# Patient Record
Sex: MALE
[De-identification: names, ages, dates, MRNs, and addresses within clinical notes are randomized per-mention and may not be internally consistent; named-entity substitution may affect disease eponyms.]

---

## 2021-01-14 ENCOUNTER — HOSPITAL ENCOUNTER (INPATIENT)
Dept: HOSPITAL 53 - M ED | Age: 86
LOS: 111 days | Discharge: NURSING, REHAB OR LONG-TERM CARE | DRG: 42 | End: 2021-05-05
Attending: STUDENT IN AN ORGANIZED HEALTH CARE EDUCATION/TRAINING PROGRAM | Admitting: INTERNAL MEDICINE
Payer: COMMERCIAL

## 2021-01-14 VITALS — HEIGHT: 60 IN | WEIGHT: 126.1 LBS | BODY MASS INDEX: 24.76 KG/M2

## 2021-01-14 VITALS — SYSTOLIC BLOOD PRESSURE: 165 MMHG | DIASTOLIC BLOOD PRESSURE: 74 MMHG

## 2021-01-14 DIAGNOSIS — F01.51: ICD-10-CM

## 2021-01-14 DIAGNOSIS — N18.30: ICD-10-CM

## 2021-01-14 DIAGNOSIS — R42: ICD-10-CM

## 2021-01-14 DIAGNOSIS — E87.5: ICD-10-CM

## 2021-01-14 DIAGNOSIS — E03.9: ICD-10-CM

## 2021-01-14 DIAGNOSIS — R07.9: ICD-10-CM

## 2021-01-14 DIAGNOSIS — G31.83: Primary | ICD-10-CM

## 2021-01-14 DIAGNOSIS — N17.9: ICD-10-CM

## 2021-01-14 DIAGNOSIS — I12.9: ICD-10-CM

## 2021-01-14 DIAGNOSIS — D53.9: ICD-10-CM

## 2021-01-14 DIAGNOSIS — Z79.899: ICD-10-CM

## 2021-01-14 DIAGNOSIS — F02.81: ICD-10-CM

## 2021-01-14 LAB
ALBUMIN SERPL BCG-MCNC: 3.7 GM/DL (ref 3.2–5.2)
ALT SERPL W P-5'-P-CCNC: 40 U/L (ref 12–78)
BASOPHILS # BLD AUTO: 0 10^3/UL (ref 0–0.2)
BASOPHILS NFR BLD AUTO: 0.5 % (ref 0–1)
BILIRUB SERPL-MCNC: 0.3 MG/DL (ref 0.2–1)
BUN SERPL-MCNC: 38 MG/DL (ref 7–18)
BUN SERPL-MCNC: 41 MG/DL (ref 7–18)
CALCIUM SERPL-MCNC: 9.2 MG/DL (ref 8.8–10.2)
CALCIUM SERPL-MCNC: 9.4 MG/DL (ref 8.8–10.2)
CHLORIDE SERPL-SCNC: 108 MEQ/L (ref 98–107)
CHLORIDE SERPL-SCNC: 109 MEQ/L (ref 98–107)
CHOLEST SERPL-MCNC: 250 MG/DL (ref ?–200)
CHOLEST/HDLC SERPL: 3.12 {RATIO} (ref ?–5)
CO2 SERPL-SCNC: 23 MEQ/L (ref 21–32)
CO2 SERPL-SCNC: 26 MEQ/L (ref 21–32)
CREAT SERPL-MCNC: 1.42 MG/DL (ref 0.7–1.3)
CREAT SERPL-MCNC: 1.62 MG/DL (ref 0.7–1.3)
EOSINOPHIL # BLD AUTO: 0 10^3/UL (ref 0–0.5)
EOSINOPHIL NFR BLD AUTO: 0.7 % (ref 0–3)
EST. AVERAGE GLUCOSE BLD GHB EST-MCNC: 117 MG/DL (ref 60–110)
ETHANOL SERPL-MCNC: < 0.003 % (ref 0–0.01)
GFR SERPL CREATININE-BSD FRML MDRD: 43.5 ML/MIN/{1.73_M2} (ref 35–?)
GFR SERPL CREATININE-BSD FRML MDRD: 50.7 ML/MIN/{1.73_M2} (ref 35–?)
GLUCOSE SERPL-MCNC: 101 MG/DL (ref 70–100)
GLUCOSE SERPL-MCNC: 96 MG/DL (ref 70–100)
HCT VFR BLD AUTO: 34.6 % (ref 42–52)
HDLC SERPL-MCNC: 80 MG/DL (ref 40–?)
HGB BLD-MCNC: 11.1 G/DL (ref 13.5–17.5)
HIV 1+2 AB+HIV1 P24 AG SERPL QL IA: NEGATIVE
LDLC SERPL CALC-MCNC: 151 MG/DL (ref ?–100)
LYMPHOCYTES # BLD AUTO: 1.2 10^3/UL (ref 1.5–5)
LYMPHOCYTES NFR BLD AUTO: 22.2 % (ref 24–44)
MCH RBC QN AUTO: 31.2 PG (ref 27–33)
MCHC RBC AUTO-ENTMCNC: 32.1 G/DL (ref 32–36.5)
MCV RBC AUTO: 97.2 FL (ref 80–96)
MONOCYTES # BLD AUTO: 0.6 10^3/UL (ref 0–0.8)
MONOCYTES NFR BLD AUTO: 10.6 % (ref 0–5)
NEUTROPHILS # BLD AUTO: 3.7 10^3/UL (ref 1.5–8.5)
NEUTROPHILS NFR BLD AUTO: 65.6 % (ref 36–66)
NONHDLC SERPL-MCNC: 170 MG/DL
PLATELET # BLD AUTO: 189 10^3/UL (ref 150–450)
POTASSIUM SERPL-SCNC: 4.8 MEQ/L (ref 3.5–5.1)
POTASSIUM SERPL-SCNC: 5.2 MEQ/L (ref 3.5–5.1)
PROT SERPL-MCNC: 7.1 GM/DL (ref 6.4–8.2)
RBC # BLD AUTO: 3.56 10^6/UL (ref 4.3–6.1)
SODIUM SERPL-SCNC: 139 MEQ/L (ref 136–145)
SODIUM SERPL-SCNC: 140 MEQ/L (ref 136–145)
T3 SERPL-MCNC: 82.9 NG/DL (ref 60–181)
T4 FREE SERPL-MCNC: 1.37 NG/DL (ref 0.76–1.46)
TRIGL SERPL-MCNC: 95 MG/DL (ref ?–150)
TSH SERPL DL<=0.005 MIU/L-ACNC: 0.02 UIU/ML (ref 0.36–3.74)
VIT B12 SERPL-MCNC: 603 PG/ML (ref 247–911)
WBC # BLD AUTO: 5.6 10^3/UL (ref 4–10)

## 2021-01-14 RX ADMIN — SODIUM CHLORIDE SCH UNITS: 4.5 INJECTION, SOLUTION INTRAVENOUS at 20:31

## 2021-01-14 NOTE — REP
INDICATION:

AMS.



COMPARISON:

None.



TECHNIQUE:

SINGLE PORTABLE AP VIEW OF THE CHEST WAS PERFORMED.



FINDINGS:

THERE IS NO ACUTE INFILTRATE OR PULMONARY EDEMA.  LUNGS ARE CLEAR.  HEART IS NOT

SIGNIFICANTLY ENLARGED.  MEDIASTINAL SILHOUETTE IS UNREMARKABLE.  THE VISUALIZED

OSSEOUS STRUCTURES ARE INTACT.



IMPRESSION:

NO ACUTE PULMONARY DISEASE.





<Electronically signed by Ever Tracy > 01/14/21 1023

## 2021-01-14 NOTE — ECGEPIP
[facility] - ED

                                       

                                       Test Date:    2021

Pat Name:     FREDRICK ARCHER               Department:   

Patient ID:   [id number]                 Room:         -

Gender:       Male                     Technician:   CARLITO

:          1935               Requested By: Charlette Glass 

Order Number: [id number]     Reading MD:   Raoul Tafoya

                                 Measurements

Intervals                              Axis          

Rate:         60                       P:            49

MA:           157                      QRS:          1

QRSD:         90                       T:            10

QT:           412                                    

QTc:          413                                    

                           Interpretive Statements

SINUS RHYTHM

POOR R WAVE PROGRESSION

NONSPECIFIC T WAVE ABNORMALITY(S)

BASELINE ARTIFACT AFFECTS INTERPRETATION

NO PRIORS FOR COMPARISON

Electronically Signed on 2021 16:29:07 EST by Raoul Tafoya

## 2021-01-14 NOTE — CCD
Summarization Of Episode

                             Created on: 2021



FREDRICK ARCHER

External Reference #: [id number]

: 1935

Sex: Male



Demographics





                          Address                   [address] [place] [address]  [address]

 

                          Home Phone                [phone]

 

                          Preferred Language        English

 

                          Marital Status            

 

                          Samaritan Affiliation     NONE

 

                          Race                       or 

 

                          Ethnic Group               or 





Author





                          Author                    [facility]

 

                          Organization              [facility]

 

                          Address                   Unknown

 

                          Phone                     Unavailable







Support





                Name            Relationship    Address         Phone

 

                RE              Next Of Kin     Unknown         Unavailable

 

                    ARCHERSONIDO         Next Of Kin         [address]  [id number] [phone]



                                  



Re-disclosure Warning

          The records that you are about to access may contain information from 
federally-assisted alcohol or drug abuse programs. If such information is 
present, then the following federally mandated warning applies: This information
has been disclosed to you from records protected by federal confidentiality 
rules (42 CFR part 2). The federal rules prohibit you from making any further 
disclosure of this information unless further disclosure is expressly permitted 
by the written consent of the person to whom it pertains or as otherwise 
permitted by 42 CFR part 2. A general authorization for the release of medical 
or other information is NOT sufficient for this purpose. The Federal rules 
restrict any use of the information to criminally investigate or prosecute any 
alcohol or drug abuse patient.The records that you are about to access may 
contain highly sensitive health information, the redisclosure of which is 
protected by Article 27-F of the [facility] Public Health law. If you 
continue you may have access to information: Regarding HIV / AIDS; Provided by 
facilities licensed or operated by the [facility] Office of Mental Health; 
or Provided by the [facility] Office for People With Developmental 
Disabilities. If such information is present, then the following [place] 
mandated warning applies: This information has been disclosed to you from 
confidential records which are protected by state law. State law prohibits you 
from making any further disclosure of this information without the specific 
written consent of the person to whom it pertains, or as otherwise permitted by 
law. Any unauthorized further disclosure in violation of state law may result in
a fine or [facility] sentence or both. A general authorization for the release of 
medical or other information is NOT sufficient authorization for further disc
losure.                                                          



Insurance Providers

          



             Payer name   Policy type / Coverage type Policy ID    Covered party

 ID Covered 

party's relationship to munoz Policy Munoz             Plan Information

 

          [facility]           [id number]                             [id number]
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
## 2021-01-15 VITALS — SYSTOLIC BLOOD PRESSURE: 140 MMHG | DIASTOLIC BLOOD PRESSURE: 84 MMHG

## 2021-01-15 VITALS — SYSTOLIC BLOOD PRESSURE: 123 MMHG | DIASTOLIC BLOOD PRESSURE: 66 MMHG

## 2021-01-15 VITALS — DIASTOLIC BLOOD PRESSURE: 77 MMHG | SYSTOLIC BLOOD PRESSURE: 156 MMHG

## 2021-01-15 LAB
ALBUMIN SERPL BCG-MCNC: 3.4 GM/DL (ref 3.2–5.2)
ALT SERPL W P-5'-P-CCNC: 39 U/L (ref 12–78)
BILIRUB SERPL-MCNC: 0.4 MG/DL (ref 0.2–1)
BUN SERPL-MCNC: 38 MG/DL (ref 7–18)
CALCIUM SERPL-MCNC: 9.6 MG/DL (ref 8.8–10.2)
CHLORIDE SERPL-SCNC: 109 MEQ/L (ref 98–107)
CO2 SERPL-SCNC: 23 MEQ/L (ref 21–32)
CREAT SERPL-MCNC: 1.34 MG/DL (ref 0.7–1.3)
GFR SERPL CREATININE-BSD FRML MDRD: 54.2 ML/MIN/{1.73_M2} (ref 35–?)
GLUCOSE SERPL-MCNC: 68 MG/DL (ref 70–100)
HCT VFR BLD AUTO: 35.7 % (ref 42–52)
HGB BLD-MCNC: 11.7 G/DL (ref 13.5–17.5)
MCH RBC QN AUTO: 32.2 PG (ref 27–33)
MCHC RBC AUTO-ENTMCNC: 32.8 G/DL (ref 32–36.5)
MCV RBC AUTO: 98.3 FL (ref 80–96)
PLATELET # BLD AUTO: 189 10^3/UL (ref 150–450)
POTASSIUM SERPL-SCNC: 4.4 MEQ/L (ref 3.5–5.1)
PROT SERPL-MCNC: 7.1 GM/DL (ref 6.4–8.2)
RBC # BLD AUTO: 3.63 10^6/UL (ref 4.3–6.1)
SODIUM SERPL-SCNC: 139 MEQ/L (ref 136–145)
WBC # BLD AUTO: 5.3 10^3/UL (ref 4–10)

## 2021-01-15 RX ADMIN — SODIUM CHLORIDE SCH UNITS: 4.5 INJECTION, SOLUTION INTRAVENOUS at 21:39

## 2021-01-15 RX ADMIN — LEVOTHYROXINE SODIUM SCH MCG: 100 TABLET ORAL at 05:13

## 2021-01-15 RX ADMIN — SODIUM CHLORIDE SCH UNITS: 4.5 INJECTION, SOLUTION INTRAVENOUS at 08:54
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
## 2021-01-16 VITALS — DIASTOLIC BLOOD PRESSURE: 52 MMHG | SYSTOLIC BLOOD PRESSURE: 148 MMHG

## 2021-01-16 VITALS — DIASTOLIC BLOOD PRESSURE: 78 MMHG | SYSTOLIC BLOOD PRESSURE: 138 MMHG

## 2021-01-16 LAB
ALBUMIN SERPL BCG-MCNC: 3.3 GM/DL (ref 3.2–5.2)
ALT SERPL W P-5'-P-CCNC: 36 U/L (ref 12–78)
BILIRUB SERPL-MCNC: 0.4 MG/DL (ref 0.2–1)
BUN SERPL-MCNC: 40 MG/DL (ref 7–18)
CALCIUM SERPL-MCNC: 9.1 MG/DL (ref 8.8–10.2)
CHLORIDE SERPL-SCNC: 110 MEQ/L (ref 98–107)
CO2 SERPL-SCNC: 26 MEQ/L (ref 21–32)
CREAT SERPL-MCNC: 1.48 MG/DL (ref 0.7–1.3)
GFR SERPL CREATININE-BSD FRML MDRD: 48.3 ML/MIN/{1.73_M2} (ref 35–?)
GLUCOSE SERPL-MCNC: 97 MG/DL (ref 70–100)
HCT VFR BLD AUTO: 34.5 % (ref 42–52)
HGB BLD-MCNC: 11.3 G/DL (ref 13.5–17.5)
MCH RBC QN AUTO: 31.2 PG (ref 27–33)
MCHC RBC AUTO-ENTMCNC: 32.8 G/DL (ref 32–36.5)
MCV RBC AUTO: 95.3 FL (ref 80–96)
PLATELET # BLD AUTO: 195 10^3/UL (ref 150–450)
POTASSIUM SERPL-SCNC: 5.4 MEQ/L (ref 3.5–5.1)
PROT SERPL-MCNC: 7.2 GM/DL (ref 6.4–8.2)
RBC # BLD AUTO: 3.62 10^6/UL (ref 4.3–6.1)
SODIUM SERPL-SCNC: 141 MEQ/L (ref 136–145)
WBC # BLD AUTO: 4.6 10^3/UL (ref 4–10)

## 2021-01-16 RX ADMIN — SODIUM CHLORIDE SCH UNITS: 4.5 INJECTION, SOLUTION INTRAVENOUS at 09:10

## 2021-01-16 RX ADMIN — SODIUM CHLORIDE SCH UNITS: 4.5 INJECTION, SOLUTION INTRAVENOUS at 22:11

## 2021-01-16 RX ADMIN — LEVOTHYROXINE SODIUM SCH MCG: 100 TABLET ORAL at 09:10

## 2021-01-16 RX ADMIN — SODIUM CHLORIDE SCH MLS/HR: 9 INJECTION, SOLUTION INTRAVENOUS at 13:07

## 2021-01-16 RX ADMIN — SODIUM CHLORIDE SCH MLS/HR: 9 INJECTION, SOLUTION INTRAVENOUS at 22:58

## 2021-01-16 NOTE — REPVR
PROCEDURE INFORMATION: 

Exam: MR Head Without Contrast 

Exam date and time: 1/16/2021 6:00 AM 

Age: 85 years old 

Clinical indication: Altered mental status/memory loss; Age related cognitive 

decline; Additional info: Confusion, worsening 



TECHNIQUE: 

Imaging protocol: MR of the head without contrast. 



COMPARISON: 

No relevant prior studies available. 



FINDINGS: 

Brain: Moderate generalized cerebral volume loss. There are extensive scattered 

and confluent areas of white matter T2 hyperintensity, nonspecific but commonly 

secondary to chronic small vessel ischemic change. No acute ischemic 

infarction. No acute intracranial hemorrhage. 

Cerebral ventricles: No ventriculomegaly. 

Bones/joints: Unremarkable. 

Paranasal sinuses: Normal as visualized. 

Mastoid air cells: No mastoid effusion. 

Orbital cavity: Bilateral lens replacements. 

Soft tissues: Unremarkable. 



IMPRESSION: 

No acute intracranial abnormality. 



Electronically signed by: Sim Desouza On 01/16/2021  12:06:19 PM
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
## 2021-01-17 VITALS — SYSTOLIC BLOOD PRESSURE: 133 MMHG | DIASTOLIC BLOOD PRESSURE: 81 MMHG

## 2021-01-17 LAB
ALBUMIN SERPL BCG-MCNC: 3.1 GM/DL (ref 3.2–5.2)
ALT SERPL W P-5'-P-CCNC: 31 U/L (ref 12–78)
BILIRUB SERPL-MCNC: 0.3 MG/DL (ref 0.2–1)
BUN SERPL-MCNC: 38 MG/DL (ref 7–18)
CALCIUM SERPL-MCNC: 9 MG/DL (ref 8.8–10.2)
CHLORIDE SERPL-SCNC: 113 MEQ/L (ref 98–107)
CO2 SERPL-SCNC: 24 MEQ/L (ref 21–32)
CREAT SERPL-MCNC: 1.4 MG/DL (ref 0.7–1.3)
GFR SERPL CREATININE-BSD FRML MDRD: 51.5 ML/MIN/{1.73_M2} (ref 35–?)
GLUCOSE SERPL-MCNC: 90 MG/DL (ref 70–100)
HCT VFR BLD AUTO: 33.3 % (ref 42–52)
HGB BLD-MCNC: 10.8 G/DL (ref 13.5–17.5)
MCH RBC QN AUTO: 31.3 PG (ref 27–33)
MCHC RBC AUTO-ENTMCNC: 32.4 G/DL (ref 32–36.5)
MCV RBC AUTO: 96.5 FL (ref 80–96)
PLATELET # BLD AUTO: 189 10^3/UL (ref 150–450)
POTASSIUM SERPL-SCNC: 4.4 MEQ/L (ref 3.5–5.1)
PROT SERPL-MCNC: 6.9 GM/DL (ref 6.4–8.2)
RBC # BLD AUTO: 3.45 10^6/UL (ref 4.3–6.1)
SODIUM SERPL-SCNC: 143 MEQ/L (ref 136–145)
WBC # BLD AUTO: 4.8 10^3/UL (ref 4–10)

## 2021-01-17 RX ADMIN — SODIUM CHLORIDE SCH MLS/HR: 9 INJECTION, SOLUTION INTRAVENOUS at 10:03

## 2021-01-17 RX ADMIN — LEVOTHYROXINE SODIUM SCH MCG: 100 TABLET ORAL at 06:55

## 2021-01-17 RX ADMIN — SODIUM CHLORIDE SCH UNITS: 4.5 INJECTION, SOLUTION INTRAVENOUS at 20:57

## 2021-01-17 RX ADMIN — SODIUM CHLORIDE SCH UNITS: 4.5 INJECTION, SOLUTION INTRAVENOUS at 10:02

## 2021-01-17 RX ADMIN — SODIUM CHLORIDE SCH MLS/HR: 9 INJECTION, SOLUTION INTRAVENOUS at 20:57
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
## 2021-01-18 VITALS — SYSTOLIC BLOOD PRESSURE: 160 MMHG | DIASTOLIC BLOOD PRESSURE: 66 MMHG

## 2021-01-18 LAB
ALBUMIN SERPL BCG-MCNC: 3.1 GM/DL (ref 3.2–5.2)
ALT SERPL W P-5'-P-CCNC: 31 U/L (ref 12–78)
BILIRUB SERPL-MCNC: 0.4 MG/DL (ref 0.2–1)
BUN SERPL-MCNC: 25 MG/DL (ref 7–18)
CALCIUM SERPL-MCNC: 8.8 MG/DL (ref 8.8–10.2)
CHLORIDE SERPL-SCNC: 112 MEQ/L (ref 98–107)
CO2 SERPL-SCNC: 26 MEQ/L (ref 21–32)
CREAT SERPL-MCNC: 1.19 MG/DL (ref 0.7–1.3)
GFR SERPL CREATININE-BSD FRML MDRD: > 60 ML/MIN/{1.73_M2} (ref 35–?)
GLUCOSE SERPL-MCNC: 82 MG/DL (ref 70–100)
HCT VFR BLD AUTO: 33.2 % (ref 42–52)
HGB BLD-MCNC: 10.7 G/DL (ref 13.5–17.5)
MCH RBC QN AUTO: 31.9 PG (ref 27–33)
MCHC RBC AUTO-ENTMCNC: 32.2 G/DL (ref 32–36.5)
MCV RBC AUTO: 99.1 FL (ref 80–96)
PLATELET # BLD AUTO: 177 10^3/UL (ref 150–450)
POTASSIUM SERPL-SCNC: 4.7 MEQ/L (ref 3.5–5.1)
PROT SERPL-MCNC: 6.2 GM/DL (ref 6.4–8.2)
RBC # BLD AUTO: 3.35 10^6/UL (ref 4.3–6.1)
SODIUM SERPL-SCNC: 143 MEQ/L (ref 136–145)
WBC # BLD AUTO: 4.5 10^3/UL (ref 4–10)

## 2021-01-18 RX ADMIN — LEVOTHYROXINE SODIUM SCH MCG: 100 TABLET ORAL at 06:10

## 2021-01-18 RX ADMIN — SODIUM CHLORIDE SCH UNITS: 4.5 INJECTION, SOLUTION INTRAVENOUS at 08:49

## 2021-01-18 RX ADMIN — SODIUM CHLORIDE SCH UNITS: 4.5 INJECTION, SOLUTION INTRAVENOUS at 22:00

## 2021-01-18 RX ADMIN — SODIUM CHLORIDE SCH MLS/HR: 9 INJECTION, SOLUTION INTRAVENOUS at 06:43
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
## 2021-01-19 VITALS — SYSTOLIC BLOOD PRESSURE: 118 MMHG | DIASTOLIC BLOOD PRESSURE: 69 MMHG

## 2021-01-19 RX ADMIN — SODIUM CHLORIDE SCH UNITS: 4.5 INJECTION, SOLUTION INTRAVENOUS at 09:00

## 2021-01-19 RX ADMIN — SODIUM CHLORIDE SCH UNITS: 4.5 INJECTION, SOLUTION INTRAVENOUS at 20:44

## 2021-01-19 RX ADMIN — SODIUM CHLORIDE SCH UNITS: 4.5 INJECTION, SOLUTION INTRAVENOUS at 21:00

## 2021-01-19 RX ADMIN — LEVOTHYROXINE SODIUM SCH MCG: 100 TABLET ORAL at 06:35

## 2021-01-19 NOTE — IPNPDOC
Text Note


Date of Service


The patient was seen on 1/19/21.





NOTE


TIME OF SERVICE 1115PM


Code 25 was called bc  was agitated and trying to leave his room.


At the time of my assessment with the assistance of a  the topic of 

his conversation was not congruent with the questions I was asking him. He was 

not able to name the building that we were in or the season.Per chart review he 

had received haloperidol and quetiapine a few hours ago; he has not recently had

leukocytosis and his most recent vitals were wnl.. RN Supervisor Taylor Munoz 

called his grand-daughter to see if she could come and help keep the patient 

oriented but she was unable to do so.





#Delirium vs Sundowning


Plan: olanzapine 5mg IM x 1/ I asked his RN to check his serum glucose and 

vitals once he ha calmed down / will also order a CBC, CMP and UA





VS,Fishbone, I+O


VS, Fishbone, I+O





Vital Signs








  Date Time  Temp Pulse Resp B/P (MAP) Pulse Ox O2 Delivery O2 Flow Rate FiO2


 


1/18/21 06:10 97.4 72 18 160/66 (97) 99 Room Air  














I&O- Last 24 Hours up to 6 AM 


 


 1/19/21





 06:00


 


Intake Total 2910 ml


 


Output Total 600 ml


 


Balance 2310 ml

















TOÑO YATES MD                Jan 19, 2021 23:47

## 2021-01-20 VITALS — SYSTOLIC BLOOD PRESSURE: 172 MMHG | DIASTOLIC BLOOD PRESSURE: 91 MMHG

## 2021-01-20 LAB
ALBUMIN SERPL BCG-MCNC: 3.4 GM/DL (ref 3.2–5.2)
ALT SERPL W P-5'-P-CCNC: 64 U/L (ref 12–78)
BASOPHILS # BLD AUTO: 0 10^3/UL (ref 0–0.2)
BASOPHILS NFR BLD AUTO: 0.6 % (ref 0–1)
BILIRUB SERPL-MCNC: 0.3 MG/DL (ref 0.2–1)
BUN SERPL-MCNC: 25 MG/DL (ref 7–18)
CALCIUM SERPL-MCNC: 9.5 MG/DL (ref 8.8–10.2)
CHLORIDE SERPL-SCNC: 107 MEQ/L (ref 98–107)
CO2 SERPL-SCNC: 26 MEQ/L (ref 21–32)
CREAT SERPL-MCNC: 1.41 MG/DL (ref 0.7–1.3)
CRP SERPL-MCNC: 0.33 MG/DL (ref 0–0.3)
EOSINOPHIL # BLD AUTO: 0.1 10^3/UL (ref 0–0.5)
EOSINOPHIL NFR BLD AUTO: 1.5 % (ref 0–3)
GFR SERPL CREATININE-BSD FRML MDRD: 51.1 ML/MIN/{1.73_M2} (ref 35–?)
GLUCOSE SERPL-MCNC: 117 MG/DL (ref 70–100)
HCT VFR BLD AUTO: 36.1 % (ref 42–52)
HGB BLD-MCNC: 11.9 G/DL (ref 13.5–17.5)
LYMPHOCYTES # BLD AUTO: 1.1 10^3/UL (ref 1.5–5)
LYMPHOCYTES NFR BLD AUTO: 30.5 % (ref 24–44)
MCH RBC QN AUTO: 32 PG (ref 27–33)
MCHC RBC AUTO-ENTMCNC: 33 G/DL (ref 32–36.5)
MCV RBC AUTO: 97 FL (ref 80–96)
MONOCYTES # BLD AUTO: 0.5 10^3/UL (ref 0–0.8)
MONOCYTES NFR BLD AUTO: 14.2 % (ref 0–5)
NEUTROPHILS # BLD AUTO: 1.8 10^3/UL (ref 1.5–8.5)
NEUTROPHILS NFR BLD AUTO: 52.9 % (ref 36–66)
PLATELET # BLD AUTO: 187 10^3/UL (ref 150–450)
POTASSIUM SERPL-SCNC: 4.7 MEQ/L (ref 3.5–5.1)
PROT SERPL-MCNC: 7.5 GM/DL (ref 6.4–8.2)
RBC # BLD AUTO: 3.72 10^6/UL (ref 4.3–6.1)
RSV RNA NPH QL NAA+PROBE: NEGATIVE
SODIUM SERPL-SCNC: 135 MEQ/L (ref 136–145)
WBC # BLD AUTO: 3.4 10^3/UL (ref 4–10)

## 2021-01-20 RX ADMIN — LEVOTHYROXINE SODIUM SCH MCG: 100 TABLET ORAL at 06:16

## 2021-01-20 RX ADMIN — SODIUM CHLORIDE SCH UNITS: 4.5 INJECTION, SOLUTION INTRAVENOUS at 07:25

## 2021-01-20 RX ADMIN — SODIUM CHLORIDE SCH UNITS: 4.5 INJECTION, SOLUTION INTRAVENOUS at 20:27

## 2021-01-21 VITALS — SYSTOLIC BLOOD PRESSURE: 153 MMHG | DIASTOLIC BLOOD PRESSURE: 74 MMHG

## 2021-01-21 RX ADMIN — LEVOTHYROXINE SODIUM SCH MCG: 100 TABLET ORAL at 05:06

## 2021-01-21 RX ADMIN — SODIUM CHLORIDE SCH UNITS: 4.5 INJECTION, SOLUTION INTRAVENOUS at 09:00

## 2021-01-21 RX ADMIN — SODIUM CHLORIDE SCH UNITS: 4.5 INJECTION, SOLUTION INTRAVENOUS at 21:15

## 2021-01-21 NOTE — IPNPDOC
Text Note


Date of Service


The patient was seen on 1/21/21.





NOTE


SUBJECTIVE:


Patient seen at bedside. Chart reviewed. Discussed with psychiatry.





OBJECTIVE: 


General: NAD


Patient not co-operative for remainder of exam.





A/P:


85-year-old male with PMHx of hypothyroidism, hypertension, questionable 

dementia admitted for altered mental status likely 2/2 to dementia (Lewey body 

versus Parkinson's?) with behaviors.


 


#Dementia (Lewy Body dementia/Parkinson's dementia?) likely with behaviors 

(aggression/violence)


-Also per psych, likely neurocognitive disorder, likely due to ischemia 


-MRI - no acute pathology


-Syphilis: nonreactive


-HIV: neg 


-TSH low but T3 and T4 normal


-UA: neg 


-case discussed previously with Dr. Gonzalez, neurology 


-Pysch assess, see their note in chart. 


-discussed again today with psych - increased qhs dosing of haldol, continue PRN

dosing, continue seroquel





#Acute kidney injury


- resolved


-grossly at baseline


- avoid nephrotoxic medications





#Anemia, macrocytic


-Vit B12, folate wnl


-No s/s of bleeding  





#Hypothyroidism


-TSH low, T3 and T4 wnl


- previously home synthroid decreased by 25 mcg


-C/w levothyroxine 100 mcg PO daily for now. 





#HTN. 


-Stable


-not on home meds





#DVT px 


-Heparin sc 





Resolved issues: 


Hyperkalemia likely 2/2 to acute kidney injury





DISPOSITION: Pending placement





VS,Fishbone, I+O


VS, Fishbone, I+O





Vital Signs








  Date Time  Temp Pulse Resp B/P (MAP) Pulse Ox O2 Delivery O2 Flow Rate FiO2


 


1/21/21 04:00 96.8 77 18 153/74 (100) 97 Room Air  














I&O- Last 24 Hours up to 6 AM 


 


 1/21/21





 06:00


 


Intake Total 590 ml


 


Output Total 0 ml


 


Balance 590 ml

















RAMON WISE MD              Jan 21, 2021 09:38

## 2021-01-22 VITALS — DIASTOLIC BLOOD PRESSURE: 71 MMHG | SYSTOLIC BLOOD PRESSURE: 150 MMHG

## 2021-01-22 RX ADMIN — SODIUM CHLORIDE SCH UNITS: 4.5 INJECTION, SOLUTION INTRAVENOUS at 20:33

## 2021-01-22 RX ADMIN — LEVOTHYROXINE SODIUM SCH MCG: 100 TABLET ORAL at 05:00

## 2021-01-22 RX ADMIN — SODIUM CHLORIDE SCH UNITS: 4.5 INJECTION, SOLUTION INTRAVENOUS at 10:06

## 2021-01-23 RX ADMIN — SODIUM CHLORIDE SCH UNITS: 4.5 INJECTION, SOLUTION INTRAVENOUS at 21:36

## 2021-01-23 RX ADMIN — SODIUM CHLORIDE SCH UNITS: 4.5 INJECTION, SOLUTION INTRAVENOUS at 09:00

## 2021-01-23 RX ADMIN — LEVOTHYROXINE SODIUM SCH MCG: 100 TABLET ORAL at 06:00

## 2021-01-24 VITALS — SYSTOLIC BLOOD PRESSURE: 151 MMHG | DIASTOLIC BLOOD PRESSURE: 70 MMHG

## 2021-01-24 RX ADMIN — LEVOTHYROXINE SODIUM SCH MCG: 100 TABLET ORAL at 06:07

## 2021-01-24 RX ADMIN — SODIUM CHLORIDE SCH UNITS: 4.5 INJECTION, SOLUTION INTRAVENOUS at 21:36

## 2021-01-24 RX ADMIN — SODIUM CHLORIDE SCH UNITS: 4.5 INJECTION, SOLUTION INTRAVENOUS at 08:42

## 2021-01-25 VITALS — DIASTOLIC BLOOD PRESSURE: 72 MMHG | SYSTOLIC BLOOD PRESSURE: 144 MMHG

## 2021-01-25 RX ADMIN — LEVOTHYROXINE SODIUM SCH MCG: 100 TABLET ORAL at 06:00

## 2021-01-25 RX ADMIN — SODIUM CHLORIDE SCH UNITS: 4.5 INJECTION, SOLUTION INTRAVENOUS at 21:00

## 2021-01-25 RX ADMIN — SODIUM CHLORIDE SCH UNITS: 4.5 INJECTION, SOLUTION INTRAVENOUS at 09:12

## 2021-01-26 VITALS — SYSTOLIC BLOOD PRESSURE: 152 MMHG | DIASTOLIC BLOOD PRESSURE: 71 MMHG

## 2021-01-26 LAB
ALBUMIN SERPL BCG-MCNC: 3.5 GM/DL (ref 3.2–5.2)
ALT SERPL W P-5'-P-CCNC: 56 U/L (ref 12–78)
BILIRUB SERPL-MCNC: 0.2 MG/DL (ref 0.2–1)
BUN SERPL-MCNC: 29 MG/DL (ref 7–18)
CALCIUM SERPL-MCNC: 9.6 MG/DL (ref 8.8–10.2)
CHLORIDE SERPL-SCNC: 107 MEQ/L (ref 98–107)
CO2 SERPL-SCNC: 28 MEQ/L (ref 21–32)
CREAT SERPL-MCNC: 1.38 MG/DL (ref 0.7–1.3)
GFR SERPL CREATININE-BSD FRML MDRD: 52.4 ML/MIN/{1.73_M2} (ref 35–?)
GLUCOSE SERPL-MCNC: 113 MG/DL (ref 70–100)
HCT VFR BLD AUTO: 37.6 % (ref 42–52)
HGB BLD-MCNC: 12.1 G/DL (ref 13.5–17.5)
MCH RBC QN AUTO: 31 PG (ref 27–33)
MCHC RBC AUTO-ENTMCNC: 32.2 G/DL (ref 32–36.5)
MCV RBC AUTO: 96.4 FL (ref 80–96)
PLATELET # BLD AUTO: 227 10^3/UL (ref 150–450)
POTASSIUM SERPL-SCNC: 4.9 MEQ/L (ref 3.5–5.1)
PROT SERPL-MCNC: 7 GM/DL (ref 6.4–8.2)
RBC # BLD AUTO: 3.9 10^6/UL (ref 4.3–6.1)
SODIUM SERPL-SCNC: 141 MEQ/L (ref 136–145)
WBC # BLD AUTO: 3.5 10^3/UL (ref 4–10)

## 2021-01-26 RX ADMIN — LEVOTHYROXINE SODIUM SCH MCG: 100 TABLET ORAL at 08:01

## 2021-01-26 RX ADMIN — SODIUM CHLORIDE SCH UNITS: 4.5 INJECTION, SOLUTION INTRAVENOUS at 21:10

## 2021-01-26 RX ADMIN — SODIUM CHLORIDE SCH UNITS: 4.5 INJECTION, SOLUTION INTRAVENOUS at 08:02

## 2021-01-26 NOTE — IPNPDOC
Date Seen


The patient was seen on 1/26/21.





Progress Note


SUBJECTIVE:


 service used for evaluation. No acute events overnight. Denies chest 

pain, n/v/d. 





OBJECTIVE: 





PHYSICAL EXAMINATION: 


VS: Please see below 


CONSTITUTIONAL: No acute distress, resting comfortably, Awake and alert oriented

to self, place but not time 


EYES: PERRLA, EOM intact


HENT, MOUTH: Normocephalic, atraumatic, moist mucous membranes


NECK: SUPPLE, no JVD, no lymphadenopathy, no carotid bruit


CV: Regular rate and rhythm, S1S2 normal, no murmurs/rubs/gallops


RESPIRATORY: Clear to auscultation bilaterally, no rales/rhonchi/wheezes


GI:  BS positive in 4 quadrants, soft, nontender, nondistended, no rebound or 

guarding, no organomegaly


: Deferred


MUSCULOSKELETAL: Normal ROM. No cyanosis, clubbing, swelling, joint deformity, 

extremity edema


INTEGUMENTARY:  Intact, no rashes, no lesions, no erythema


NEUROLOGIC: Cranial Nerves II-XII are intact, no focal deficits


PSYCHIATRIC: cooperative, mood and affect appropriate .





LABORATORY: 


Please see below 





A/P:


85-year-old male with PMHx of hypothyroidism, hypertension, questionable 

dementia admitted for altered mental status likely 2/2 to dementia (Lewey body 

versus Parkinson's?) with behaviors.


 


#Dementia (Lewy Body dementia/Parkinson's dementia?) likely with behaviors 

(aggression/violence)


-Also per psych, likely neurocognitive disorder, likely due to ischemia 


-MRI - no acute pathology


-Syphilis: nonreactive


-HIV: neg 


-TSH low but T3 and T4 normal


-UA: neg 


-case discussed previously with Dr. Gonzalez, neurology 


-[facility] assess, see their note in chart. 


-C/w  qhs dosing of haldol, haldol PRN dosing, seroquel BID 


-Requiring placement, PFS/SW working on this 





#CKD Stage 2-3? 


-Unknown baseline but now believed patient likely has had increased Cr for some 

time 


-Cr 1.38


-Follow BMP regularly 


- avoid nephrotoxic medications





#Anemia, macrocytic


-Vit B12, folate wnl


-No s/s of bleeding  





#Hypothyroidism


-TSH low, T3 and T4 wnl


- previously home synthroid decreased by 25 mcg


-C/w levothyroxine 100 mcg PO daily for now. 





#HTN. 


-Stable


-not on home meds





#DVT px 


-Heparin sc 





DISPOSITION: Pending placement. PFS/SW working on avenues necessary to proceed 

forward.





VS, I&O, 24H, Fishbone


Vital Signs/I&O





Vital Signs








  Date Time  Temp Pulse Resp B/P (MAP) Pulse Ox O2 Delivery O2 Flow Rate FiO2


 


1/26/21 06:00 97.1 69 16 152/71 (98) 98 Room Air  














I&O- Last 24 Hours up to 6 AM 


 


 1/26/21





 06:00


 


Intake Total 1320 ml


 


Balance 1320 ml











Laboratory Data


24H LABS


Laboratory Tests 2


1/26/21 09:17: 


Nucleated Red Blood Cells % (auto) 0.0, Anion Gap 6L, Glomerular Filtration Rate

52.4, Calcium Level 9.6, Total Bilirubin 0.2, Aspartate Amino Transf (AST/SGOT) 

29, Alanine Aminotransferase (ALT/SGPT) 56, Alkaline Phosphatase 73, Total 

Protein 7.0, Albumin 3.5, Albumin/Globulin Ratio 1.0


CBC/BMP


Laboratory Tests


1/26/21 09:17











Current Medications





Current Medications








 Medications


  (Trade)  Dose


 Ordered  Sig/Radha


 Route


 PRN Reason  Start Time


 Stop Time Status Last Admin


Dose Admin


 


 Haloperidol


  (Haldol)  0.5 mg  TIDP  PRN


 PO


 ANXIETY/AGITATION  1/15/21 19:30


    1/22/21 23:41





 


 Haloperidol


  (Haldol)  1 mg  QHS


 PO


   1/15/21 21:00


 1/21/21 09:32 DC 1/20/21 20:27





 


 Haloperidol


  (Haldol)  1.5 mg  QHS


 PO


   1/21/21 21:00


    1/25/21 21:22





 


 Heparin Sodium


  (Porcine)


  (Heparin)  5,000 units  Q12H


 SQ


   1/14/21 21:00


    1/26/21 08:02





 


 Home Med


  (Med Rec


 Complete!)    ASDIRECTED


 XX


   1/14/21 12:15


 1/14/21 12:07 DC  





 


 Levothyroxine


 Sodium


  (Synthroid)  100 mcg  DAILY@06


 PO


   1/15/21 06:00


    1/26/21 08:01





 


 Quetiapine


 Fumarate


  (SEROquel)  25 mg  BID


 PO


   1/14/21 21:00


    1/26/21 08:01





 


 Sodium Chloride  1,000 ml @ 


 100 mls/hr  Q10H


 IV


   1/14/21 12:15


 1/14/21 20:53 DC 1/14/21 14:47





 


 Sodium Chloride  1,000 ml @ 


 100 mls/hr  Q10H


 IV


   1/16/21 12:15


 1/18/21 07:45 DC 1/18/21 06:43





 


 Trazodone HCl


  (Desyrel)  50 mg  QHS


 PO


   1/14/21 21:00


 1/14/21 14:33 DC  














Allergies


Coded Allergies:  


     No Known Drug Allergies (Verified  Allergy, Unknown, 1/14/21)











Bianka Matt MD            Jan 26, 2021 13:23

## 2021-01-27 VITALS — DIASTOLIC BLOOD PRESSURE: 75 MMHG | SYSTOLIC BLOOD PRESSURE: 167 MMHG

## 2021-01-27 RX ADMIN — LEVOTHYROXINE SODIUM SCH MCG: 100 TABLET ORAL at 06:47

## 2021-01-27 RX ADMIN — SODIUM CHLORIDE SCH UNITS: 4.5 INJECTION, SOLUTION INTRAVENOUS at 09:19

## 2021-01-27 RX ADMIN — SODIUM CHLORIDE SCH UNITS: 4.5 INJECTION, SOLUTION INTRAVENOUS at 19:46

## 2021-01-28 RX ADMIN — SODIUM CHLORIDE SCH UNITS: 4.5 INJECTION, SOLUTION INTRAVENOUS at 19:32

## 2021-01-28 RX ADMIN — LEVOTHYROXINE SODIUM SCH MCG: 100 TABLET ORAL at 06:00

## 2021-01-28 RX ADMIN — SODIUM CHLORIDE SCH UNITS: 4.5 INJECTION, SOLUTION INTRAVENOUS at 08:14

## 2021-01-29 VITALS — DIASTOLIC BLOOD PRESSURE: 90 MMHG | SYSTOLIC BLOOD PRESSURE: 165 MMHG

## 2021-01-29 RX ADMIN — LEVOTHYROXINE SODIUM SCH MCG: 100 TABLET ORAL at 05:56

## 2021-01-29 RX ADMIN — SODIUM CHLORIDE SCH UNITS: 4.5 INJECTION, SOLUTION INTRAVENOUS at 19:29

## 2021-01-29 RX ADMIN — SODIUM CHLORIDE SCH UNITS: 4.5 INJECTION, SOLUTION INTRAVENOUS at 08:32

## 2021-01-30 VITALS — DIASTOLIC BLOOD PRESSURE: 65 MMHG | SYSTOLIC BLOOD PRESSURE: 140 MMHG

## 2021-01-30 RX ADMIN — SODIUM CHLORIDE SCH UNITS: 4.5 INJECTION, SOLUTION INTRAVENOUS at 19:28

## 2021-01-30 RX ADMIN — LEVOTHYROXINE SODIUM SCH MCG: 100 TABLET ORAL at 05:18

## 2021-01-30 RX ADMIN — SODIUM CHLORIDE SCH UNITS: 4.5 INJECTION, SOLUTION INTRAVENOUS at 09:05

## 2021-01-31 VITALS — SYSTOLIC BLOOD PRESSURE: 141 MMHG | DIASTOLIC BLOOD PRESSURE: 69 MMHG

## 2021-01-31 RX ADMIN — DOCUSATE SODIUM SCH MG: 100 CAPSULE, LIQUID FILLED ORAL at 09:00

## 2021-01-31 RX ADMIN — SODIUM CHLORIDE SCH UNITS: 4.5 INJECTION, SOLUTION INTRAVENOUS at 19:45

## 2021-01-31 RX ADMIN — DOCUSATE SODIUM SCH MG: 100 CAPSULE, LIQUID FILLED ORAL at 19:44

## 2021-01-31 RX ADMIN — SODIUM CHLORIDE SCH UNITS: 4.5 INJECTION, SOLUTION INTRAVENOUS at 08:44

## 2021-01-31 RX ADMIN — LEVOTHYROXINE SODIUM SCH MCG: 100 TABLET ORAL at 06:15

## 2021-02-01 RX ADMIN — DOCUSATE SODIUM SCH MG: 100 CAPSULE, LIQUID FILLED ORAL at 09:47

## 2021-02-01 RX ADMIN — LEVOTHYROXINE SODIUM SCH MCG: 100 TABLET ORAL at 06:27

## 2021-02-01 RX ADMIN — SODIUM CHLORIDE SCH UNITS: 4.5 INJECTION, SOLUTION INTRAVENOUS at 20:01

## 2021-02-01 RX ADMIN — DOCUSATE SODIUM SCH MG: 100 CAPSULE, LIQUID FILLED ORAL at 20:01

## 2021-02-01 RX ADMIN — SODIUM CHLORIDE SCH UNITS: 4.5 INJECTION, SOLUTION INTRAVENOUS at 09:47

## 2021-02-02 VITALS — SYSTOLIC BLOOD PRESSURE: 148 MMHG | DIASTOLIC BLOOD PRESSURE: 71 MMHG

## 2021-02-02 RX ADMIN — DOCUSATE SODIUM SCH MG: 100 CAPSULE, LIQUID FILLED ORAL at 09:35

## 2021-02-02 RX ADMIN — DOCUSATE SODIUM SCH MG: 100 CAPSULE, LIQUID FILLED ORAL at 20:47

## 2021-02-02 RX ADMIN — SODIUM CHLORIDE SCH UNITS: 4.5 INJECTION, SOLUTION INTRAVENOUS at 09:35

## 2021-02-02 RX ADMIN — SODIUM CHLORIDE SCH UNITS: 4.5 INJECTION, SOLUTION INTRAVENOUS at 20:47

## 2021-02-02 RX ADMIN — LEVOTHYROXINE SODIUM SCH MCG: 100 TABLET ORAL at 06:04

## 2021-02-03 VITALS — DIASTOLIC BLOOD PRESSURE: 69 MMHG | SYSTOLIC BLOOD PRESSURE: 145 MMHG

## 2021-02-03 RX ADMIN — LEVOTHYROXINE SODIUM SCH MCG: 100 TABLET ORAL at 06:06

## 2021-02-03 RX ADMIN — DOCUSATE SODIUM SCH MG: 100 CAPSULE, LIQUID FILLED ORAL at 20:33

## 2021-02-03 RX ADMIN — SODIUM CHLORIDE SCH UNITS: 4.5 INJECTION, SOLUTION INTRAVENOUS at 20:33

## 2021-02-03 RX ADMIN — DOCUSATE SODIUM SCH MG: 100 CAPSULE, LIQUID FILLED ORAL at 08:34

## 2021-02-03 RX ADMIN — SODIUM CHLORIDE SCH UNITS: 4.5 INJECTION, SOLUTION INTRAVENOUS at 08:34

## 2021-02-04 VITALS — SYSTOLIC BLOOD PRESSURE: 113 MMHG | DIASTOLIC BLOOD PRESSURE: 60 MMHG

## 2021-02-04 RX ADMIN — SODIUM CHLORIDE SCH UNITS: 4.5 INJECTION, SOLUTION INTRAVENOUS at 08:24

## 2021-02-04 RX ADMIN — SODIUM CHLORIDE SCH UNITS: 4.5 INJECTION, SOLUTION INTRAVENOUS at 21:04

## 2021-02-04 RX ADMIN — DOCUSATE SODIUM SCH MG: 100 CAPSULE, LIQUID FILLED ORAL at 21:03

## 2021-02-04 RX ADMIN — LEVOTHYROXINE SODIUM SCH MCG: 100 TABLET ORAL at 06:08

## 2021-02-04 RX ADMIN — DOCUSATE SODIUM SCH MG: 100 CAPSULE, LIQUID FILLED ORAL at 08:24

## 2021-02-05 VITALS — SYSTOLIC BLOOD PRESSURE: 128 MMHG | DIASTOLIC BLOOD PRESSURE: 74 MMHG

## 2021-02-05 RX ADMIN — LEVOTHYROXINE SODIUM SCH MCG: 100 TABLET ORAL at 06:09

## 2021-02-05 RX ADMIN — SODIUM CHLORIDE SCH UNITS: 4.5 INJECTION, SOLUTION INTRAVENOUS at 09:06

## 2021-02-05 RX ADMIN — DOCUSATE SODIUM SCH MG: 100 CAPSULE, LIQUID FILLED ORAL at 20:11

## 2021-02-05 RX ADMIN — SODIUM CHLORIDE SCH UNITS: 4.5 INJECTION, SOLUTION INTRAVENOUS at 20:11

## 2021-02-05 RX ADMIN — DOCUSATE SODIUM SCH MG: 100 CAPSULE, LIQUID FILLED ORAL at 09:06

## 2021-02-06 VITALS — SYSTOLIC BLOOD PRESSURE: 129 MMHG | DIASTOLIC BLOOD PRESSURE: 74 MMHG

## 2021-02-06 RX ADMIN — DOCUSATE SODIUM SCH MG: 100 CAPSULE, LIQUID FILLED ORAL at 09:35

## 2021-02-06 RX ADMIN — DOCUSATE SODIUM SCH MG: 100 CAPSULE, LIQUID FILLED ORAL at 21:27

## 2021-02-06 RX ADMIN — SODIUM CHLORIDE SCH UNITS: 4.5 INJECTION, SOLUTION INTRAVENOUS at 21:28

## 2021-02-06 RX ADMIN — SODIUM CHLORIDE SCH UNITS: 4.5 INJECTION, SOLUTION INTRAVENOUS at 09:35

## 2021-02-06 RX ADMIN — LEVOTHYROXINE SODIUM SCH MCG: 100 TABLET ORAL at 06:14

## 2021-02-07 VITALS — DIASTOLIC BLOOD PRESSURE: 73 MMHG | SYSTOLIC BLOOD PRESSURE: 130 MMHG

## 2021-02-07 RX ADMIN — DOCUSATE SODIUM SCH MG: 100 CAPSULE, LIQUID FILLED ORAL at 09:19

## 2021-02-07 RX ADMIN — SODIUM CHLORIDE SCH UNITS: 4.5 INJECTION, SOLUTION INTRAVENOUS at 09:19

## 2021-02-07 RX ADMIN — SODIUM CHLORIDE SCH UNITS: 4.5 INJECTION, SOLUTION INTRAVENOUS at 21:08

## 2021-02-07 RX ADMIN — LEVOTHYROXINE SODIUM SCH MCG: 100 TABLET ORAL at 05:17

## 2021-02-07 RX ADMIN — DOCUSATE SODIUM SCH MG: 100 CAPSULE, LIQUID FILLED ORAL at 21:08

## 2021-02-07 NOTE — IPNPDOC
Text Note


Date of Service


The patient was seen on 2/7/21.





NOTE


SUBJECTIVE:


No acute events overnight. Denies any medical complaints.  





OBJECTIVE: 


VS: Please see below 


CONSTITUTIONAL: No acute distress, resting comfortably, Awake and alert oriented

to self, place but not time 


EYES: PERRLA, EOM intact


HENT, MOUTH: Normocephalic, atraumatic, moist mucous membranes


NECK: SUPPLE, no JVD, no lymphadenopathy, no carotid bruit


CV: Regular rate and rhythm, S1S2 normal, no murmurs/rubs/gallops


RESPIRATORY: Clear to auscultation bilaterally, no rales/rhonchi/wheezes


GI:  BS positive in 4 quadrants, soft, nontender, nondistended, no rebound or 

guarding, no organomegaly


: Deferred


MUSCULOSKELETAL: Normal ROM. No cyanosis, clubbing, swelling, joint deformity, 

extremity edema


INTEGUMENTARY:  Intact, no rashes, no lesions, no erythema


NEUROLOGIC: Cranial Nerves II-XII are intact, no focal deficits


PSYCHIATRIC: cooperative, mood and affect appropriate .





LABORATORY: 


Please see below 





A/P:


85-year-old male with PMHx of hypothyroidism, hypertension, questionable 

dementia admitted for altered mental status likely 2/2 to dementia (Lewey body 

versus Parkinson's?) with behaviors.


 


#Dementia (Lewy Body dementia/Parkinson's dementia?) likely with behaviors 

(aggression/violence)


-Also per psych, likely neurocognitive disorder, likely due to ischemia 


-MRI - no acute pathology


-Syphilis: nonreactive


-HIV: neg 


-TSH low but T3 and T4 normal


-UA: neg 


-case discussed previously with Dr. Gonzalez, neurology 


-Pysch assess, see their note in chart. 


-C/w  qhs dosing of haldol, haldol PRN dosing, seroquel BID 


-Requiring placement, PFS/SW working on this 





#CKD Stage 2-3? 


-Unknown baseline but now believed patient likely has had increased Cr for some 

time 


-Cr 1.38


-Follow BMP regularly 


- avoid nephrotoxic medications





#Anemia, macrocytic


-Vit B12, folate wnl


-No s/s of bleeding  





#Hypothyroidism


-TSH low, T3 and T4 wnl


- previously home synthroid decreased by 25 mcg


-C/w levothyroxine 100 mcg PO daily for now. 





#HTN. 


-Stable


-not on home meds





#DVT px 


-Heparin sc 





DISPOSITION: Pending placement. PFS/SW working on avenues necessary to proceed 

forward. Discontinued PRN Haldol. Routine labs pending.





VS,Fishbone, I+O


VS, Fishbone, I+O





Vital Signs








  Date Time  Temp Pulse Resp B/P (MAP) Pulse Ox O2 Delivery O2 Flow Rate FiO2


 


2/7/21 05:52 96.0 68 20 130/73 (92) 98 Room Air  














I&O- Last 24 Hours up to 6 AM 


 


 2/7/21





 06:00


 


Intake Total 1920 ml


 


Output Total 0 ml


 


Balance 1920 ml

















RAMON IWSE MD               Feb 7, 2021 12:11

## 2021-02-08 VITALS — DIASTOLIC BLOOD PRESSURE: 73 MMHG | SYSTOLIC BLOOD PRESSURE: 139 MMHG

## 2021-02-08 LAB
BUN SERPL-MCNC: 36 MG/DL (ref 7–18)
CALCIUM SERPL-MCNC: 9.8 MG/DL (ref 8.8–10.2)
CHLORIDE SERPL-SCNC: 108 MEQ/L (ref 98–107)
CO2 SERPL-SCNC: 25 MEQ/L (ref 21–32)
CREAT SERPL-MCNC: 1.28 MG/DL (ref 0.7–1.3)
GFR SERPL CREATININE-BSD FRML MDRD: 57.1 ML/MIN/{1.73_M2} (ref 35–?)
GLUCOSE SERPL-MCNC: 88 MG/DL (ref 70–100)
HCT VFR BLD AUTO: 35.3 % (ref 42–52)
HGB BLD-MCNC: 11.4 G/DL (ref 13.5–17.5)
MCH RBC QN AUTO: 31.6 PG (ref 27–33)
MCHC RBC AUTO-ENTMCNC: 32.3 G/DL (ref 32–36.5)
MCV RBC AUTO: 97.8 FL (ref 80–96)
PLATELET # BLD AUTO: 217 10^3/UL (ref 150–450)
POTASSIUM SERPL-SCNC: 5 MEQ/L (ref 3.5–5.1)
RBC # BLD AUTO: 3.61 10^6/UL (ref 4.3–6.1)
SODIUM SERPL-SCNC: 141 MEQ/L (ref 136–145)
WBC # BLD AUTO: 4 10^3/UL (ref 4–10)

## 2021-02-08 RX ADMIN — LEVOTHYROXINE SODIUM SCH MCG: 100 TABLET ORAL at 05:17

## 2021-02-08 RX ADMIN — SODIUM CHLORIDE SCH UNITS: 4.5 INJECTION, SOLUTION INTRAVENOUS at 08:24

## 2021-02-08 RX ADMIN — SODIUM CHLORIDE SCH UNITS: 4.5 INJECTION, SOLUTION INTRAVENOUS at 20:56

## 2021-02-08 RX ADMIN — DOCUSATE SODIUM SCH MG: 100 CAPSULE, LIQUID FILLED ORAL at 08:23

## 2021-02-08 RX ADMIN — DOCUSATE SODIUM SCH MG: 100 CAPSULE, LIQUID FILLED ORAL at 20:56

## 2021-02-09 VITALS — SYSTOLIC BLOOD PRESSURE: 134 MMHG | DIASTOLIC BLOOD PRESSURE: 76 MMHG

## 2021-02-09 RX ADMIN — LEVOTHYROXINE SODIUM SCH MCG: 100 TABLET ORAL at 05:43

## 2021-02-09 RX ADMIN — SODIUM CHLORIDE SCH UNITS: 4.5 INJECTION, SOLUTION INTRAVENOUS at 21:47

## 2021-02-09 RX ADMIN — DOCUSATE SODIUM SCH MG: 100 CAPSULE, LIQUID FILLED ORAL at 21:47

## 2021-02-09 RX ADMIN — DOCUSATE SODIUM SCH MG: 100 CAPSULE, LIQUID FILLED ORAL at 08:18

## 2021-02-09 RX ADMIN — SODIUM CHLORIDE SCH UNITS: 4.5 INJECTION, SOLUTION INTRAVENOUS at 08:21

## 2021-02-10 VITALS — SYSTOLIC BLOOD PRESSURE: 139 MMHG | DIASTOLIC BLOOD PRESSURE: 74 MMHG

## 2021-02-10 RX ADMIN — LEVOTHYROXINE SODIUM SCH MCG: 100 TABLET ORAL at 05:52

## 2021-02-10 RX ADMIN — SODIUM CHLORIDE SCH UNITS: 4.5 INJECTION, SOLUTION INTRAVENOUS at 20:12

## 2021-02-10 RX ADMIN — LEVOTHYROXINE SODIUM SCH MCG: 100 TABLET ORAL at 05:53

## 2021-02-10 RX ADMIN — SODIUM CHLORIDE SCH UNITS: 4.5 INJECTION, SOLUTION INTRAVENOUS at 09:03

## 2021-02-10 RX ADMIN — DOCUSATE SODIUM SCH MG: 100 CAPSULE, LIQUID FILLED ORAL at 09:03

## 2021-02-10 RX ADMIN — DOCUSATE SODIUM SCH MG: 100 CAPSULE, LIQUID FILLED ORAL at 20:12

## 2021-02-11 VITALS — DIASTOLIC BLOOD PRESSURE: 84 MMHG | SYSTOLIC BLOOD PRESSURE: 174 MMHG

## 2021-02-11 RX ADMIN — DOCUSATE SODIUM SCH MG: 100 CAPSULE, LIQUID FILLED ORAL at 20:28

## 2021-02-11 RX ADMIN — SODIUM CHLORIDE SCH UNITS: 4.5 INJECTION, SOLUTION INTRAVENOUS at 20:28

## 2021-02-11 RX ADMIN — LEVOTHYROXINE SODIUM SCH MCG: 100 TABLET ORAL at 04:53

## 2021-02-11 RX ADMIN — DOCUSATE SODIUM SCH MG: 100 CAPSULE, LIQUID FILLED ORAL at 09:37

## 2021-02-11 RX ADMIN — SODIUM CHLORIDE SCH UNITS: 4.5 INJECTION, SOLUTION INTRAVENOUS at 09:37

## 2021-02-12 VITALS — SYSTOLIC BLOOD PRESSURE: 151 MMHG | DIASTOLIC BLOOD PRESSURE: 82 MMHG

## 2021-02-12 VITALS — DIASTOLIC BLOOD PRESSURE: 78 MMHG | SYSTOLIC BLOOD PRESSURE: 151 MMHG

## 2021-02-12 RX ADMIN — LEVOTHYROXINE SODIUM SCH MCG: 100 TABLET ORAL at 05:51

## 2021-02-12 RX ADMIN — SODIUM CHLORIDE SCH UNITS: 4.5 INJECTION, SOLUTION INTRAVENOUS at 21:00

## 2021-02-12 RX ADMIN — DOCUSATE SODIUM SCH MG: 100 CAPSULE, LIQUID FILLED ORAL at 09:42

## 2021-02-12 RX ADMIN — SODIUM CHLORIDE SCH UNITS: 4.5 INJECTION, SOLUTION INTRAVENOUS at 09:43

## 2021-02-12 RX ADMIN — DOCUSATE SODIUM SCH MG: 100 CAPSULE, LIQUID FILLED ORAL at 22:45

## 2021-02-13 VITALS — SYSTOLIC BLOOD PRESSURE: 119 MMHG | DIASTOLIC BLOOD PRESSURE: 54 MMHG

## 2021-02-13 RX ADMIN — LEVOTHYROXINE SODIUM SCH MCG: 100 TABLET ORAL at 06:00

## 2021-02-13 RX ADMIN — SODIUM CHLORIDE SCH UNITS: 4.5 INJECTION, SOLUTION INTRAVENOUS at 20:49

## 2021-02-13 RX ADMIN — DOCUSATE SODIUM SCH MG: 100 CAPSULE, LIQUID FILLED ORAL at 20:50

## 2021-02-13 RX ADMIN — SENNOSIDES PRN TAB: 8.6 TABLET, FILM COATED ORAL at 20:50

## 2021-02-13 RX ADMIN — DOCUSATE SODIUM SCH MG: 100 CAPSULE, LIQUID FILLED ORAL at 09:16

## 2021-02-13 RX ADMIN — SODIUM CHLORIDE SCH UNITS: 4.5 INJECTION, SOLUTION INTRAVENOUS at 09:16

## 2021-02-13 NOTE — IPNPDOC
Text Note


Date of Service


The patient was seen on 2/13/21.





NOTE


SUBJECTIVE: No issues this week, continues to have a sitter . But no aggressive 

behavior this week. 





PHYSICAL EXAMINATION: 


VS: As below


CONSTITUTIONAL: No acute distress, resting comfortably, Awake and alert oriented

to self, place but not time 


EYES: PERRLA, EOM intact


HEENT, MOUTH: Normocephalic, atraumatic, moist mucous membranes


NECK: SUPPLE, no JVD, no lymphadenopathy, no carotid bruit


CV: Regular rate and rhythm, S1S2 normal, no murmurs/rubs/gallops


RESPIRATORY: Clear to auscultation bilaterally, no rales/rhonchi/wheezes


GI:  BS positive in 4 quadrants, soft, nontender, nondistended, no rebound or 

guarding, no organomegaly


MUSCULOSKELETAL: Normal ROM. No cyanosis, clubbing, swelling, joint deformity, 

extremity edema


INTEGUMENTARY:  Intact, no rashes, no lesions, no erythema


NEUROLOGIC: Cranial Nerves II-XII are intact, no focal deficits


PSYCHIATRIC: cooperative, mood and affect appropriate .





LABORATORY: 


Please see below 





A/P:


85-year-old male with PMHx of hypothyroidism, hypertension, admitted for altered

mental status with behavioral abnormality (aggression/violence)


 


Dementia (Lewy Body dementia/Parkinson's dementia?)


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 


MRI - no acute pathology


Syphilis: nonreactive, HIV: neg ,TSH low but T3 and T4 normal


Haldol at bed time and Seroquel BID


Behaviors controlled with current medication regimen.  


Requiring placement, PFS/SW working on this 





CKD Stage 2-3 


Unknown baseline but now believed patient likely has had increased Cr for some 

time 


Follow BMP regularly 


avoid nephrotoxic medications





Anemia, macrocytic


Vit B12, folate wnl


No s/s of bleeding  





Hypothyroidism


TSH low, T3 and T4 wnl


C/w levothyroxine 100 mcg PO daily for now reduced from 125 mcg





HTN. 


started on amlodipine. 





DVT px 


Heparin sc 





DISPOSITION: Pending placement. PFS/SW working on avenues necessary to proceed 

forward.





VS,Fishbone, I+O


VS, Fishbone, I+O





Vital Signs








  Date Time  Temp Pulse Resp B/P (MAP) Pulse Ox O2 Delivery O2 Flow Rate FiO2


 


2/12/21 14:00 98.2 69 18 151/78 (102) 100 Room Air  














I&O- Last 24 Hours up to 6 AM 


 


 2/13/21





 05:59


 


Intake Total 1610 ml


 


Output Total 0 ml


 


Balance 1610 ml

















MONAE SCHWARTZ MD                   Feb 13, 2021 11:54

## 2021-02-14 VITALS — DIASTOLIC BLOOD PRESSURE: 62 MMHG | SYSTOLIC BLOOD PRESSURE: 116 MMHG

## 2021-02-14 RX ADMIN — SODIUM CHLORIDE SCH UNITS: 4.5 INJECTION, SOLUTION INTRAVENOUS at 21:35

## 2021-02-14 RX ADMIN — DOCUSATE SODIUM SCH MG: 100 CAPSULE, LIQUID FILLED ORAL at 21:35

## 2021-02-14 RX ADMIN — DOCUSATE SODIUM SCH MG: 100 CAPSULE, LIQUID FILLED ORAL at 08:14

## 2021-02-14 RX ADMIN — LEVOTHYROXINE SODIUM SCH MCG: 100 TABLET ORAL at 06:00

## 2021-02-14 RX ADMIN — SODIUM CHLORIDE SCH UNITS: 4.5 INJECTION, SOLUTION INTRAVENOUS at 08:14

## 2021-02-15 VITALS — DIASTOLIC BLOOD PRESSURE: 63 MMHG | SYSTOLIC BLOOD PRESSURE: 148 MMHG

## 2021-02-15 RX ADMIN — SODIUM CHLORIDE SCH UNITS: 4.5 INJECTION, SOLUTION INTRAVENOUS at 21:15

## 2021-02-15 RX ADMIN — SODIUM CHLORIDE SCH UNITS: 4.5 INJECTION, SOLUTION INTRAVENOUS at 08:16

## 2021-02-15 RX ADMIN — LEVOTHYROXINE SODIUM SCH MCG: 100 TABLET ORAL at 06:22

## 2021-02-15 RX ADMIN — DOCUSATE SODIUM SCH MG: 100 CAPSULE, LIQUID FILLED ORAL at 08:16

## 2021-02-15 RX ADMIN — DOCUSATE SODIUM SCH MG: 100 CAPSULE, LIQUID FILLED ORAL at 21:10

## 2021-02-16 VITALS — SYSTOLIC BLOOD PRESSURE: 145 MMHG | DIASTOLIC BLOOD PRESSURE: 64 MMHG

## 2021-02-16 RX ADMIN — LEVOTHYROXINE SODIUM SCH MCG: 100 TABLET ORAL at 06:19

## 2021-02-16 RX ADMIN — DOCUSATE SODIUM SCH MG: 100 CAPSULE, LIQUID FILLED ORAL at 09:51

## 2021-02-16 RX ADMIN — SODIUM CHLORIDE SCH UNITS: 4.5 INJECTION, SOLUTION INTRAVENOUS at 09:51

## 2021-02-16 RX ADMIN — DOCUSATE SODIUM SCH MG: 100 CAPSULE, LIQUID FILLED ORAL at 20:53

## 2021-02-16 RX ADMIN — SODIUM CHLORIDE SCH UNITS: 4.5 INJECTION, SOLUTION INTRAVENOUS at 20:53
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
## 2021-02-17 VITALS — SYSTOLIC BLOOD PRESSURE: 105 MMHG | DIASTOLIC BLOOD PRESSURE: 65 MMHG

## 2021-02-17 RX ADMIN — DOCUSATE SODIUM SCH MG: 100 CAPSULE, LIQUID FILLED ORAL at 20:18

## 2021-02-17 RX ADMIN — LEVOTHYROXINE SODIUM SCH MCG: 100 TABLET ORAL at 05:52

## 2021-02-17 RX ADMIN — DOCUSATE SODIUM SCH MG: 100 CAPSULE, LIQUID FILLED ORAL at 09:09

## 2021-02-18 VITALS — DIASTOLIC BLOOD PRESSURE: 78 MMHG | SYSTOLIC BLOOD PRESSURE: 154 MMHG

## 2021-02-18 RX ADMIN — SENNOSIDES PRN TAB: 8.6 TABLET, FILM COATED ORAL at 21:22

## 2021-02-18 RX ADMIN — LEVOTHYROXINE SODIUM SCH MCG: 100 TABLET ORAL at 06:00

## 2021-02-18 RX ADMIN — DOCUSATE SODIUM SCH MG: 100 CAPSULE, LIQUID FILLED ORAL at 21:22

## 2021-02-18 RX ADMIN — DOCUSATE SODIUM SCH MG: 100 CAPSULE, LIQUID FILLED ORAL at 09:43

## 2021-02-19 VITALS — DIASTOLIC BLOOD PRESSURE: 47 MMHG | SYSTOLIC BLOOD PRESSURE: 96 MMHG

## 2021-02-19 RX ADMIN — DOCUSATE SODIUM SCH MG: 100 CAPSULE, LIQUID FILLED ORAL at 19:47

## 2021-02-19 RX ADMIN — ACETAMINOPHEN PRN MG: 325 TABLET ORAL at 15:47

## 2021-02-19 RX ADMIN — DOCUSATE SODIUM SCH MG: 100 CAPSULE, LIQUID FILLED ORAL at 08:00

## 2021-02-19 RX ADMIN — ACETAMINOPHEN PRN MG: 325 TABLET ORAL at 19:47

## 2021-02-19 RX ADMIN — LEVOTHYROXINE SODIUM SCH MCG: 100 TABLET ORAL at 05:46

## 2021-02-20 VITALS — DIASTOLIC BLOOD PRESSURE: 76 MMHG | SYSTOLIC BLOOD PRESSURE: 150 MMHG

## 2021-02-20 RX ADMIN — LEVOTHYROXINE SODIUM SCH MCG: 100 TABLET ORAL at 05:49

## 2021-02-20 RX ADMIN — DOCUSATE SODIUM SCH MG: 100 CAPSULE, LIQUID FILLED ORAL at 20:07

## 2021-02-20 RX ADMIN — ACETAMINOPHEN PRN MG: 325 TABLET ORAL at 16:49

## 2021-02-20 RX ADMIN — DOCUSATE SODIUM SCH MG: 100 CAPSULE, LIQUID FILLED ORAL at 08:35

## 2021-02-21 VITALS — SYSTOLIC BLOOD PRESSURE: 133 MMHG | DIASTOLIC BLOOD PRESSURE: 88 MMHG

## 2021-02-21 RX ADMIN — ACETAMINOPHEN PRN MG: 325 TABLET ORAL at 18:31

## 2021-02-21 RX ADMIN — LEVOTHYROXINE SODIUM SCH MCG: 100 TABLET ORAL at 06:26

## 2021-02-21 RX ADMIN — DOCUSATE SODIUM SCH MG: 100 CAPSULE, LIQUID FILLED ORAL at 08:46

## 2021-02-21 RX ADMIN — DOCUSATE SODIUM SCH MG: 100 CAPSULE, LIQUID FILLED ORAL at 20:17

## 2021-02-21 NOTE — IPNPDOC
Date Seen


The patient was seen on 2/21/21.





Progress Note


SUBJECTIVE: Patient comfortable in bed, awake, no complaints.





PHYSICAL EXAMINATION:


VITAL SIGNS: Please see below.


GENERAL: No distress


HEENT: Normocephalic, atraumatic, moist mucous membranes


NECK: Supple


CARDIOVASCULAR EXAMINATION: S1, S2, no murmurs


RESPIRATORY EXAMINATION: Clear to auscultation, no wheezing


ABDOMINAL EXAMINATION: Soft, nontender, nondistended, positive bowel sounds


EXTREMITIES: Range of motion intact


SKIN: No rash


NEUROLOGICAL EXAMINATION: no focal deficits 


PSYCHIATRIC EXAMINATION: Calm and cooperative





LABORATORY DATA, IMAGING STUDIES, MICROBIOLOGY: Please see below.





ASSESSMENT AND PLAN: 85-year-old male with past medical history of dementia and 

is awaiting trial regarding placement.





PROBLEMS:


1. Dementia.


 Lewy body/Parkinson's.


 Has episodes of violent behavior, no issues over the past 1-2 weeks.


 Continue Seroquel


 Having social issues regarding placement, has court date next week.





2. Hypertension


 Continue Norvasc





3. Hypothyroidism.


 Continue levothyroxine





DVT prophylaxis: Not needed as patient is ambulatory and ALC status at this 

time.





VS, I&O, 24H, Fishbone


Vital Signs/I&O





Vital Signs








  Date Time  Temp Pulse Resp B/P (MAP) Pulse Ox O2 Delivery O2 Flow Rate FiO2


 


2/21/21 08:45  59  133/88    


 


2/21/21 06:24 98.0  20  97 Room Air  














I&O- Last 24 Hours up to 6 AM 


 


 2/21/21





 06:00


 


Intake Total 1680 ml


 


Output Total 0 ml


 


Balance 1680 ml

















GONDAL,KHUBAIB N. MD           Feb 21, 2021 12:07

## 2021-02-22 VITALS — SYSTOLIC BLOOD PRESSURE: 116 MMHG | DIASTOLIC BLOOD PRESSURE: 70 MMHG

## 2021-02-22 VITALS — SYSTOLIC BLOOD PRESSURE: 114 MMHG | DIASTOLIC BLOOD PRESSURE: 70 MMHG

## 2021-02-22 RX ADMIN — LEVOTHYROXINE SODIUM SCH MCG: 100 TABLET ORAL at 05:53

## 2021-02-22 RX ADMIN — DOCUSATE SODIUM SCH MG: 100 CAPSULE, LIQUID FILLED ORAL at 19:52

## 2021-02-22 RX ADMIN — DOCUSATE SODIUM SCH MG: 100 CAPSULE, LIQUID FILLED ORAL at 09:03

## 2021-02-23 VITALS — DIASTOLIC BLOOD PRESSURE: 70 MMHG | SYSTOLIC BLOOD PRESSURE: 140 MMHG

## 2021-02-23 RX ADMIN — DOCUSATE SODIUM SCH MG: 100 CAPSULE, LIQUID FILLED ORAL at 19:54

## 2021-02-23 RX ADMIN — DOCUSATE SODIUM SCH MG: 100 CAPSULE, LIQUID FILLED ORAL at 09:05

## 2021-02-23 RX ADMIN — LEVOTHYROXINE SODIUM SCH MCG: 100 TABLET ORAL at 05:18

## 2021-02-23 RX ADMIN — ACETAMINOPHEN PRN MG: 325 TABLET ORAL at 19:54

## 2021-02-23 RX ADMIN — SENNOSIDES PRN TAB: 8.6 TABLET, FILM COATED ORAL at 19:54

## 2021-02-24 VITALS — SYSTOLIC BLOOD PRESSURE: 175 MMHG | DIASTOLIC BLOOD PRESSURE: 85 MMHG

## 2021-02-24 VITALS — DIASTOLIC BLOOD PRESSURE: 66 MMHG | SYSTOLIC BLOOD PRESSURE: 136 MMHG

## 2021-02-24 VITALS — SYSTOLIC BLOOD PRESSURE: 142 MMHG | DIASTOLIC BLOOD PRESSURE: 54 MMHG

## 2021-02-24 VITALS — SYSTOLIC BLOOD PRESSURE: 137 MMHG | DIASTOLIC BLOOD PRESSURE: 70 MMHG

## 2021-02-24 LAB
ALBUMIN SERPL BCG-MCNC: 3.4 GM/DL (ref 3.2–5.2)
ALBUMIN SERPL BCG-MCNC: 3.4 GM/DL (ref 3.2–5.2)
ALT SERPL W P-5'-P-CCNC: 25 U/L (ref 12–78)
ALT SERPL W P-5'-P-CCNC: 25 U/L (ref 12–78)
APTT BLD: 20.8 SECONDS (ref 24.2–38.5)
BASOPHILS # BLD AUTO: 0 10^3/UL (ref 0–0.2)
BASOPHILS NFR BLD AUTO: 0.5 % (ref 0–1)
BILIRUB SERPL-MCNC: 0.2 MG/DL (ref 0.2–1)
BILIRUB SERPL-MCNC: 0.2 MG/DL (ref 0.2–1)
BUN SERPL-MCNC: 33 MG/DL (ref 7–18)
BUN SERPL-MCNC: 38 MG/DL (ref 7–18)
CALCIUM SERPL-MCNC: 9.2 MG/DL (ref 8.8–10.2)
CALCIUM SERPL-MCNC: 9.5 MG/DL (ref 8.8–10.2)
CHLORIDE SERPL-SCNC: 102 MEQ/L (ref 98–107)
CHLORIDE SERPL-SCNC: 106 MEQ/L (ref 98–107)
CK MB CFR.DF SERPL CALC: 2.5
CK MB CFR.DF SERPL CALC: 2.89
CK MB SERPL-MCNC: 2 NG/ML (ref ?–3.6)
CK MB SERPL-MCNC: 2.6 NG/ML (ref ?–3.6)
CK SERPL-CCNC: 80 U/L (ref 39–308)
CK SERPL-CCNC: 90 U/L (ref 39–308)
CO2 SERPL-SCNC: 26 MEQ/L (ref 21–32)
CO2 SERPL-SCNC: 30 MEQ/L (ref 21–32)
CREAT SERPL-MCNC: 1.37 MG/DL (ref 0.7–1.3)
CREAT SERPL-MCNC: 1.64 MG/DL (ref 0.7–1.3)
EOSINOPHIL # BLD AUTO: 0.2 10^3/UL (ref 0–0.5)
EOSINOPHIL NFR BLD AUTO: 4.3 % (ref 0–3)
GFR SERPL CREATININE-BSD FRML MDRD: 42.9 ML/MIN/{1.73_M2} (ref 35–?)
GFR SERPL CREATININE-BSD FRML MDRD: 52.8 ML/MIN/{1.73_M2} (ref 35–?)
GLUCOSE SERPL-MCNC: 130 MG/DL (ref 70–100)
GLUCOSE SERPL-MCNC: 90 MG/DL (ref 70–100)
HCT VFR BLD AUTO: 33.7 % (ref 42–52)
HGB BLD-MCNC: 11.1 G/DL (ref 13.5–17.5)
INR PPP: 0.95
LYMPHOCYTES # BLD AUTO: 0.9 10^3/UL (ref 1.5–5)
LYMPHOCYTES NFR BLD AUTO: 22.9 % (ref 24–44)
MCH RBC QN AUTO: 31.8 PG (ref 27–33)
MCHC RBC AUTO-ENTMCNC: 32.9 G/DL (ref 32–36.5)
MCV RBC AUTO: 96.6 FL (ref 80–96)
MONOCYTES # BLD AUTO: 0.5 10^3/UL (ref 0–0.8)
MONOCYTES NFR BLD AUTO: 11.6 % (ref 2–8)
NEUTROPHILS # BLD AUTO: 2.4 10^3/UL (ref 1.5–8.5)
NEUTROPHILS NFR BLD AUTO: 60.4 % (ref 36–66)
PLATELET # BLD AUTO: 193 10^3/UL (ref 150–450)
POTASSIUM SERPL-SCNC: 4.4 MEQ/L (ref 3.5–5.1)
POTASSIUM SERPL-SCNC: 5.5 MEQ/L (ref 3.5–5.1)
PROT SERPL-MCNC: 7 GM/DL (ref 6.4–8.2)
PROT SERPL-MCNC: 7.4 GM/DL (ref 6.4–8.2)
PROTHROMBIN TIME: 12.9 SECONDS (ref 12.5–14.3)
RBC # BLD AUTO: 3.49 10^6/UL (ref 4.3–6.1)
SODIUM SERPL-SCNC: 136 MEQ/L (ref 136–145)
SODIUM SERPL-SCNC: 138 MEQ/L (ref 136–145)
TROPONIN I SERPL-MCNC: < 0.02 NG/ML (ref ?–0.1)
TROPONIN I SERPL-MCNC: < 0.02 NG/ML (ref ?–0.1)
WBC # BLD AUTO: 4 10^3/UL (ref 4–10)

## 2021-02-24 RX ADMIN — DOCUSATE SODIUM SCH MG: 100 CAPSULE, LIQUID FILLED ORAL at 21:23

## 2021-02-24 RX ADMIN — LEVOTHYROXINE SODIUM SCH MCG: 100 TABLET ORAL at 10:04

## 2021-02-24 RX ADMIN — DOCUSATE SODIUM SCH MG: 100 CAPSULE, LIQUID FILLED ORAL at 10:04

## 2021-02-24 NOTE — IPNPDOC
Subjective


Date Seen


The patient was seen on 2/24/21.





Subjective


Chief Complaint/HPI


Today patient was found on the floor. With the help of  service it 

was determined that he had right sided chest pain and was feeling dizzy so got 

out of the chair and fell down to the floor on his right side. He did not hit 

his head. His vitals were stable. He has dementia so it was very difficult for 

the  to get more information. We tried twice with 2 different 

interpreters but his speech is very soft and unclear and he mumbles his answers 

and as per the  often his answers which are mostly unclear and often 

unrelated to the question. He did say that he started feeling dizzy after his 

morning medications. When i tried again with  to find out more about 

his chest pain he denied any chest pain to me. He complained of right sided body

pain , leg and thigh pain. On exam he looked dry and we had difficulty in 

getting an IV into him and getting labs from him. I think he probably is 

dehydrated so he became dizzy and fell. However in view of his initial complaint

of chest pain will rule out for ACS with 2 sets of cardiac enzymes and 2 ekgs. 

Will also get a CT angio of the Chest if repeat creatinine is ok.





Objective


Physical Examination


General Exam:  Positive: Alert, Cooperative, No Acute Distress


Eye Exam:  Positive: Other Eye Symptoms (keeps eyes closed)


ENT Exam:  Positive: Atraumatic, Pharynx Normal


Neck Exam:  Positive: Supple; 


   Negative: JVD, thyromegaly


Chest Exam:  Positive: Clear to auscultation, Normal air movement


Heart Exam:  Positive: Rate Normal, Regular Rhythm, Normal S1, Normal S2; 


   Negative: Murmurs, Rubs


Abdomen Exam:  Positive: Normal bowel sounds, Soft; 


   Negative: Tenderness


Extremity Exam:  Negative: Clubbing, Cyanosis, Edema


Neuro Exam:  Positive: Other (moves all 4 extremities, speech is mumbled. )





Assessment /Plan


Assessment


85-year-old male with PMHx of hypothyroidism, hypertension, admitted for altered

mental status with behavioral abnormality (aggression/violence). It was 

determined that he has dementia and now awaiting for long term placement. He had

an episode of chest pain dizziness and fall today. 





Chest pain, dizziness and fall


rule out ACS and pulmonary embolism


ekgs and cardiac maker initials were normal


will order CT angio


will repeat labs after hydration





Dizziness


probably orthostatic due to dehydration or medications


as per nurses he has good appetite and eats and drinks well.


will stop amlodipine 


IVF. 





Dementia (Lewy Body dementia/Parkinson's dementia?)


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 


MRI - no acute pathology


Syphilis: nonreactive, HIV: neg ,TSH low but T3 and T4 normal


Haldol at bed time and Seroquel BID


Behaviors controlled with current medication regimen.  


Requiring placement, PFS/SW working on this 





CKD Stage 2-3 


Unknown baseline but now believed patient likely has had increased Cr for some 

time 


Follow BMP regularly 


avoid nephrotoxic medications





Anemia, macrocytic


Vit B12, folate wnl


No s/s of bleeding  





Hypothyroidism


TSH low, T3 and T4 wnl


C/w levothyroxine 100 mcg PO daily for now reduced from 125 mcg





HTN. 


Bp well controlled, today had dizziness and fall so will stop amlodipine for 

now. 





DVT px 


Heparin sc





Plan/VTE


VTE Prophylaxis Ordered?:  Yes





VS, I&O, 24H, Fishbone


Vital Signs/I&O





Vital Signs








  Date Time  Temp Pulse Resp B/P (MAP) Pulse Ox O2 Delivery O2 Flow Rate FiO2


 


2/24/21 10:04  68  136/66    


 


2/23/21 06:00 98.5  18  100   


 


2/21/21 06:24      Room Air  














I&O- Last 24 Hours up to 6 AM 


 


 2/24/21





 06:00


 


Intake Total 660 ml


 


Output Total 0 ml


 


Balance 660 ml











Laboratory Data


24H LABS


Laboratory Tests 2


2/24/21 12:47: 


Prothrombin Time 12.9, Prothromb Time International Ratio 0.95, Activated 

Partial Thromboplast Time 20.8L, Anion Gap 4L, Glomerular Filtration Rate 42.9, 

Calcium Level 9.5, Total Bilirubin 0.2, Aspartate Amino Transf (AST/SGOT) 16, 

Alanine Aminotransferase (ALT/SGPT) 25, Alkaline Phosphatase 64, Total Creatine 

Kinase 80, Creatine Kinase MB 2.0, Creatine Kinase MB Relative Index 2.50, 

Troponin I < 0.02, Total Protein 7.0, Albumin 3.4, Albumin/Globulin Ratio 0.9


2/24/21 13:32: 


Immature Granulocyte % (Auto) 0.3, Neutrophils (%) (Auto) 60.4, Lymphocytes (%) 

(Auto) 22.9L, Monocytes (%) (Auto) 11.6H, Eosinophils (%) (Auto) 4.3H, Basophils

(%) (Auto) 0.5, Neutrophils # (Auto) 2.4, Lymphocytes # (Auto) 0.9L, Monocytes #

(Auto) 0.5, Eosinophils # (Auto) 0.2, Basophils # (Auto) 0.0, Nucleated Red 

Blood Cells % (auto) 0.0


CBC/BMP


Laboratory Tests


2/24/21 12:47








2/24/21 13:32

















MONAE SCHWARTZ MD                   Feb 24, 2021 18:14

## 2021-02-25 VITALS — SYSTOLIC BLOOD PRESSURE: 121 MMHG | DIASTOLIC BLOOD PRESSURE: 72 MMHG

## 2021-02-25 LAB
BUN SERPL-MCNC: 28 MG/DL (ref 7–18)
CALCIUM SERPL-MCNC: 9 MG/DL (ref 8.8–10.2)
CHLORIDE SERPL-SCNC: 109 MEQ/L (ref 98–107)
CO2 SERPL-SCNC: 26 MEQ/L (ref 21–32)
CREAT SERPL-MCNC: 1.23 MG/DL (ref 0.7–1.3)
GFR SERPL CREATININE-BSD FRML MDRD: 59.8 ML/MIN/{1.73_M2} (ref 35–?)
GLUCOSE SERPL-MCNC: 89 MG/DL (ref 70–100)
POTASSIUM SERPL-SCNC: 4.7 MEQ/L (ref 3.5–5.1)
SODIUM SERPL-SCNC: 140 MEQ/L (ref 136–145)

## 2021-02-25 RX ADMIN — SODIUM CHLORIDE SCH MLS/HR: 9 INJECTION, SOLUTION INTRAVENOUS at 10:08

## 2021-02-25 RX ADMIN — DOCUSATE SODIUM SCH MG: 100 CAPSULE, LIQUID FILLED ORAL at 10:08

## 2021-02-25 RX ADMIN — DOCUSATE SODIUM SCH MG: 100 CAPSULE, LIQUID FILLED ORAL at 20:57

## 2021-02-25 RX ADMIN — LEVOTHYROXINE SODIUM SCH MCG: 100 TABLET ORAL at 06:22

## 2021-02-25 NOTE — REP
INDICATION:

new tremors and increased confusion.



COMPARISON:

Comparison study January 14, 2021..



TECHNIQUE:

Helical scanning is acquired. 5 mm axial images were reformatted.  Coronal MPR images

were generated.



FINDINGS:

Bone window settings demonstrate an intact bony calvarium.  There is no evidence of

skull fracture or incidental bony calvarial lesion.  The visualized paranasal sinuses

appear clear.  No intraorbital abnormality is seen.  On soft tissue window setting

images; the lateral, third, and fourth ventricles are normal in size and position.

Gray-white differentiation pattern is normal above and below the tentorium.  There are

is no evidence of intracranial hemorrhage.  No mass, edema, infarction, or midline

shift is seen.  No extra-axial fluid collection is appreciated.



There is generalized volume loss and small vessel changes are noted in the

periventricular white matter.  These findings are stable when compared with the

January 14, 2021 study.



IMPRESSION:

No acute intracranial abnormality..





<Electronically signed by Shin Junior > 02/25/21 [id number]

## 2021-02-25 NOTE — REP
INDICATION:

right chest pain , dizziness and collapse.



COMPARISON:

Portable chest 01/14/2021



TECHNIQUE:

CT angiogram chest performed following the intravenous administration of 75 cc of

Isovue 370.  Sagittal and coronal reconstruction images are performed.



FINDINGS:

Lungs: Shows only minor dependent atelectatic change posteriorly in the lower lung

zones.

Mediastinum: No adenopathy.  Some coronary artery calcifications are noted.

Pulmonary arteries: No evidence of pulmonary embolism.

Margaret: No adenopathy.

Axilla: No adenopathy.

Pleura: No effusion.

Heart: Not enlarged.

Thoracic aorta: The aorta is tortuous, ectatic and with calcifications at the arch and

descending portion.

Upper abdominal structures: No hiatal hernia.  Upper pole cyst of the left kidney

noted.  Adrenal glands gallbladder, liver and spleen without acute finding.  Pancreas

shows atrophy but no mass or other acute finding.

Visualized osseous structures: Age-appropriate degenerative changes without acute

fracture compression deformity.



IMPRESSION:

No CT evidence of pulmonary embolism.No infiltrate seen.  No mediastinal or hilar

mass.  A tortuous calcified ectatic aorta without aneurysm or dissection.





<Electronically signed by Tera Wolf > 02/25/21 [id number]

## 2021-02-25 NOTE — IPNPDOC
Subjective


Date Seen


The patient was seen on 2/25/21.





Subjective


Chief Complaint/HPI


No events overnight. But this morning patietn sleeping on waking him up he just 

mumbles. He did eat full breakfast and dinner last night. CTA chest and CT head 

done this morning was negative for any acute events. Will reduce dose of 

seroquel





Objective


Physical Examination


General Exam:  Positive: Alert, Cooperative, No Acute Distress


Eye Exam:  Positive: Other Eye Symptoms (keeps eyes closed)


ENT Exam:  Positive: Atraumatic, Pharynx Normal


Neck Exam:  Positive: Supple; 


   Negative: JVD, thyromegaly


Chest Exam:  Positive: Clear to auscultation, Normal air movement


Heart Exam:  Positive: Rate Normal, Regular Rhythm, Normal S1, Normal S2; 


   Negative: Murmurs, Rubs


Abdomen Exam:  Positive: Normal bowel sounds, Soft; 


   Negative: Tenderness


Extremity Exam:  Negative: Clubbing, Cyanosis, Edema


Neuro Exam:  Positive: Other (moves all 4 extremities, speech is mumbled. )





Assessment /Plan


Assessment


85-year-old male with PMHx of hypothyroidism, hypertension, admitted for altered

mental status with behavioral abnormality (aggression/violence). It was 

determined that he has dementia and now awaiting for long term placement. He had

an episode of chest pain dizziness and fall today. 





Chest pain, dizziness and fall


ruled out ACS and pulmonary embolism


ekgs and cardiac makers negative


CTA chest negative


CT head negative





Dizziness


probably orthostatic due to dehydration or medications


as per nurses he has good appetite and eats and drinks well.


will stop amlodipine 


IVF. 





Somnolence today


ct head negative


will reduce dose of seroquel. 





Dementia (Lewy Body dementia/Parkinson's dementia?)


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 


MRI - no acute pathology


Syphilis: nonreactive, HIV: neg ,TSH low but T3 and T4 normal


Haldol at bed time and Seroquel BID


Behaviors controlled with current medication regimen.  


Requiring placement, PFS/SW working on this 





CKD Stage 2-3 


Unknown baseline but now believed patient likely has had increased Cr for some 

time 


Follow BMP regularly 


avoid nephrotoxic medications





Anemia, macrocytic


Vit B12, folate wnl


No s/s of bleeding  





Hypothyroidism


TSH low, T3 and T4 wnl


C/w levothyroxine 100 mcg PO daily for now reduced from 125 mcg





HTN. 


Bp well controlled, today had dizziness and fall so will stop amlodipine for 

now. 





DVT px 


Heparin sc





Plan/VTE


VTE Prophylaxis Ordered?:  Yes





VS, I&O, 24H, [place]


Vital Signs/I&O





Vital Signs








  Date Time  Temp Pulse Resp B/P (MAP) Pulse Ox O2 Delivery O2 Flow Rate FiO2


 


2/25/21 06:00 96.7 62 19 121/72 (88) 99 Room Air  














I&O- Last 24 Hours up to 6 AM 


 


 2/25/21





 07:00


 


Intake Total 1250 ml


 


Output Total 0 ml


 


Balance 1250 ml











Laboratory Data


24H LABS


Laboratory Tests 2


2/24/21 18:14: 


Anion Gap 6L, Glomerular Filtration Rate 52.8, Calcium Level 9.2, Total 

Bilirubin 0.2, Aspartate Amino Transf (AST/SGOT) 18, Alanine Aminotransferase 

(ALT/SGPT) 25, Alkaline Phosphatase 64, Total Creatine Kinase 90, Creatine 

Kinase MB 2.6, Creatine Kinase MB Relative Index 2.89, Troponin I < 0.02, Total 

Protein 7.4, Albumin 3.4, Albumin/Globulin Ratio 0.9


2/25/21 07:42: 


Anion Gap 5L, Glomerular Filtration Rate 59.8, Calcium Level 9.0


CBC/BMP


Laboratory Tests


2/24/21 18:14








2/25/21 07:42

















MONAE SCHWARTZ MD                   Feb 25, 2021 15:21

## 2021-02-26 VITALS — DIASTOLIC BLOOD PRESSURE: 71 MMHG | SYSTOLIC BLOOD PRESSURE: 133 MMHG

## 2021-02-26 RX ADMIN — SODIUM CHLORIDE SCH MLS/HR: 9 INJECTION, SOLUTION INTRAVENOUS at 05:33

## 2021-02-26 RX ADMIN — DOCUSATE SODIUM SCH MG: 100 CAPSULE, LIQUID FILLED ORAL at 19:46

## 2021-02-26 RX ADMIN — DOCUSATE SODIUM SCH MG: 100 CAPSULE, LIQUID FILLED ORAL at 09:43

## 2021-02-26 RX ADMIN — LEVOTHYROXINE SODIUM SCH MCG: 100 TABLET ORAL at 05:30

## 2021-02-26 NOTE — REP
INDICATION:

fall and complaining of groin painand suprapubic pain.



COMPARISON:

None.



TECHNIQUE:

Noncontrast scanning through the pelvis with coronal and sagittal reconstructions.



FINDINGS:

Abdominal aorta shows some atherosclerotic calcifications seen extending into the

iliac and femoral arteries without aneurysm.  No periaortic, intra-abdominal or pelvic

lymphadenopathy.  There is no ventral or inguinal hernia.  Bladder shows slightly

hyperdense urine likely related to CT angio chest yesterday.  No distal ureteral

dilatation or stone and no bladder stone, mass or wall thickening.  Distal left colon

and sigmoid with diverticulosis but no diverticulitis or colitis cecum shows no

adjacent inflammatory change small bowel loops in the lower abdomen abdomen and pelvis

on this examination were unremarkable.



The bone windows show the sacrum, SI joints and iliac wings without fracture or

destructive lesion.  There is degenerative facet arthritis from L3-4 through L5-S1

without spondylolysis or spondylolisthesis.  There is disc space narrowing at L4-5

with small osteophytes at endplates and sparing the level above and below no

compression fracture or destructive lesions.



Bony hips showed mild joint space narrowing consistent with some degree of

chondromalacia there is subchondral cystic change and sclerosis at the acetabular roof

of the right greater than left.  Rim osteophytes are noted the acetabulum.  There are

anterior column lucencies subchondral cysts on the right all part of a degenerative

process.  No visible or displaced fracture.  Pubic rami, symphysis pubis and pelvis

without fracture.



IMPRESSION:

1. Osteoarthritic changes of both hips with joint space narrowing and acetabular roof

sclerosis subchondral cystic changes, right slightly greater than left.  No visible

fracture.

2. Pelvis and sacrum without fracture.  Lower lumbar spine with degenerative disc

changes at L4-5 as well as facet arthritis at the 3 lower most levels.

3. No intrapelvic soft tissue abnormality.





<Electronically signed by Tera Wolf > 02/26/21 [id number]

## 2021-02-26 NOTE — ECGEPIP
[facility]

                                       

                                       Test Date:    2021

Pat Name:     FREDRICK ARCHER               Department:   

Patient ID:   [id number]                 Room:         [place] [address]

Gender:       Male                     Technician:   ROSE

:          1935               Requested By: MONAE SCHWARTZ 

Order Number: [id number]     Reading MD:   Diego Brown

                                 Measurements

Intervals                              Axis          

Rate:         68                       P:            59

WY:           156                      QRS:          23

QRSD:         84                       T:            26

QT:           380                                    

QTc:          404                                    

                           Interpretive Statements

Normal sinus rhythm

Last tracing on 21 at 10:19, no significant changes

Electronically Signed on 2021 0:40:40 EST by Diego Brown

## 2021-02-26 NOTE — IPNPDOC
Subjective


Date Seen


The patient was seen on 2/26/21.





Subjective


Chief Complaint/HPI


Translater [id number].


he complained of pain in left leg while walking. While he was pointing to his 

groin and suprapubic area. He reported that he can walk to the bathroom but is 

afraid to go alone as he feels he is going to fall. He denied any chest pain . 

He did say he was sometimes dizzy but was getting better. He was repeating same 

things often and mumbling some answers so difficult for the translater to 

understand. Later he spoke with rand daughter over the phone and then he com

plained of numbness of his legs.





Objective


Physical Examination


General Exam:  Positive: Alert, Cooperative, No Acute Distress


ENT Exam:  Positive: Atraumatic, Pharynx Normal


Neck Exam:  Positive: Supple; 


   Negative: JVD, thyromegaly


Chest Exam:  Positive: Clear to auscultation, Normal air movement


Heart Exam:  Positive: Rate Normal, Regular Rhythm, Normal S1, Normal S2; 


   Negative: Murmurs, Rubs


Abdomen Exam:  Positive: Normal bowel sounds, Soft; 


   Negative: Tenderness


Extremity Exam:  Negative: Clubbing, Cyanosis, Edema


Neuro Exam:  Positive: Other (moves all 4 extremities, speech is mumbled. )





Assessment /Plan


Assessment


85-year-old male with PMHx of hypothyroidism, hypertension, admitted for altered

mental status with behavioral abnormality (aggression/violence). It was 

determined that he has dementia and now awaiting for long term placement. He had

an episode of chest pain dizziness and fall today. 





Chest pain, dizziness and fall


ruled out ACS and pulmonary embolism


ekgs and cardiac makers negative


CTA chest negative


CT head negative





Dizziness


probably orthostatic due to dehydration or medications


as per nurses he has good appetite and eats and drinks well.


will stop amlodipine 





Excessive Somnolence 


ct head negative


reduced dose of seroquel. 





hernando and leg pain


will get CT pelvis to rule out any occult fractures. 





Dementia (Lewy Body dementia/Parkinson's dementia?)


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 


MRI - no acute pathology


Syphilis: nonreactive, HIV: neg ,TSH low but T3 and T4 normal


Haldol at bed time and Seroquel BID


Behaviors controlled with current medication regimen.  


Requiring placement, PFS/SW working on this 





CKD Stage 2-3 


Unknown baseline but now believed patient likely has had increased Cr for some 

time 


Follow BMP regularly 


avoid nephrotoxic medications





Anemia, macrocytic


Vit B12, folate wnl


No s/s of bleeding  





Hypothyroidism


TSH low, T3 and T4 wnl


C/w levothyroxine 100 mcg PO daily for now reduced from 125 mcg





HTN. 


Bp well controlled, today had dizziness and fall so will stop amlodipine for 

now. 





DVT px 


Heparin sc





Plan/VTE


VTE Prophylaxis Ordered?:  Yes





VS, I&O, 24H, Fishbone


Vital Signs/I&O





Vital Signs








  Date Time  Temp Pulse Resp B/P (MAP) Pulse Ox O2 Delivery O2 Flow Rate FiO2


 


2/26/21 05:58 97.5 63 20 133/71 (91) 97 Room Air  














I&O- Last 24 Hours up to 6 AM 


 


 2/26/21





 05:59


 


Intake Total 2060 ml


 


Output Total 0 ml


 


Balance 2060 ml

















MONAE SCHWARTZ MD                   Feb 26, 2021 11:21

## 2021-02-27 VITALS — SYSTOLIC BLOOD PRESSURE: 94 MMHG | DIASTOLIC BLOOD PRESSURE: 57 MMHG

## 2021-02-27 RX ADMIN — DOCUSATE SODIUM SCH MG: 100 CAPSULE, LIQUID FILLED ORAL at 19:39

## 2021-02-27 RX ADMIN — ACETAMINOPHEN SCH MG: 500 TABLET ORAL at 19:40

## 2021-02-27 RX ADMIN — LEVOTHYROXINE SODIUM SCH MCG: 100 TABLET ORAL at 05:32

## 2021-02-27 RX ADMIN — DOCUSATE SODIUM SCH MG: 100 CAPSULE, LIQUID FILLED ORAL at 07:43

## 2021-02-28 VITALS — DIASTOLIC BLOOD PRESSURE: 57 MMHG | SYSTOLIC BLOOD PRESSURE: 124 MMHG

## 2021-02-28 RX ADMIN — DOCUSATE SODIUM SCH MG: 100 CAPSULE, LIQUID FILLED ORAL at 08:14

## 2021-02-28 RX ADMIN — LEVOTHYROXINE SODIUM SCH MCG: 100 TABLET ORAL at 06:11

## 2021-02-28 RX ADMIN — ACETAMINOPHEN SCH MG: 500 TABLET ORAL at 21:37

## 2021-02-28 RX ADMIN — ACETAMINOPHEN SCH MG: 500 TABLET ORAL at 08:14

## 2021-02-28 RX ADMIN — DOCUSATE SODIUM SCH MG: 100 CAPSULE, LIQUID FILLED ORAL at 21:37

## 2021-03-01 VITALS — SYSTOLIC BLOOD PRESSURE: 149 MMHG | DIASTOLIC BLOOD PRESSURE: 64 MMHG

## 2021-03-01 RX ADMIN — DOCUSATE SODIUM SCH MG: 100 CAPSULE, LIQUID FILLED ORAL at 09:19

## 2021-03-01 RX ADMIN — ACETAMINOPHEN SCH MG: 500 TABLET ORAL at 20:46

## 2021-03-01 RX ADMIN — DOCUSATE SODIUM SCH MG: 100 CAPSULE, LIQUID FILLED ORAL at 20:46

## 2021-03-01 RX ADMIN — LEVOTHYROXINE SODIUM SCH MCG: 100 TABLET ORAL at 05:57

## 2021-03-01 RX ADMIN — ACETAMINOPHEN SCH MG: 500 TABLET ORAL at 09:20

## 2021-03-02 VITALS — SYSTOLIC BLOOD PRESSURE: 125 MMHG | DIASTOLIC BLOOD PRESSURE: 53 MMHG

## 2021-03-02 RX ADMIN — LEVOTHYROXINE SODIUM SCH MCG: 100 TABLET ORAL at 05:23

## 2021-03-02 RX ADMIN — ACETAMINOPHEN PRN MG: 325 TABLET ORAL at 18:38

## 2021-03-02 RX ADMIN — DOCUSATE SODIUM SCH MG: 100 CAPSULE, LIQUID FILLED ORAL at 08:47

## 2021-03-02 RX ADMIN — DOCUSATE SODIUM SCH MG: 100 CAPSULE, LIQUID FILLED ORAL at 20:08

## 2021-03-02 RX ADMIN — ACETAMINOPHEN SCH MG: 500 TABLET ORAL at 20:08

## 2021-03-02 RX ADMIN — ACETAMINOPHEN SCH MG: 500 TABLET ORAL at 08:47

## 2021-03-03 VITALS — DIASTOLIC BLOOD PRESSURE: 60 MMHG | SYSTOLIC BLOOD PRESSURE: 144 MMHG

## 2021-03-03 RX ADMIN — ACETAMINOPHEN SCH MG: 500 TABLET ORAL at 21:44

## 2021-03-03 RX ADMIN — DOCUSATE SODIUM SCH MG: 100 CAPSULE, LIQUID FILLED ORAL at 08:36

## 2021-03-03 RX ADMIN — ACETAMINOPHEN SCH MG: 500 TABLET ORAL at 08:36

## 2021-03-03 RX ADMIN — DOCUSATE SODIUM SCH MG: 100 CAPSULE, LIQUID FILLED ORAL at 21:43

## 2021-03-03 RX ADMIN — LEVOTHYROXINE SODIUM SCH MCG: 100 TABLET ORAL at 05:26

## 2021-03-04 VITALS — DIASTOLIC BLOOD PRESSURE: 60 MMHG | SYSTOLIC BLOOD PRESSURE: 148 MMHG

## 2021-03-04 RX ADMIN — ACETAMINOPHEN SCH MG: 500 TABLET ORAL at 10:21

## 2021-03-04 RX ADMIN — ACETAMINOPHEN SCH MG: 500 TABLET ORAL at 09:00

## 2021-03-04 RX ADMIN — LEVOTHYROXINE SODIUM SCH MCG: 100 TABLET ORAL at 06:35

## 2021-03-04 RX ADMIN — ACETAMINOPHEN SCH MG: 500 TABLET ORAL at 20:24

## 2021-03-04 RX ADMIN — DOCUSATE SODIUM SCH MG: 100 CAPSULE, LIQUID FILLED ORAL at 20:25

## 2021-03-04 RX ADMIN — DOCUSATE SODIUM SCH MG: 100 CAPSULE, LIQUID FILLED ORAL at 09:00

## 2021-03-05 VITALS — SYSTOLIC BLOOD PRESSURE: 156 MMHG | DIASTOLIC BLOOD PRESSURE: 66 MMHG

## 2021-03-05 RX ADMIN — LEVOTHYROXINE SODIUM SCH MCG: 100 TABLET ORAL at 06:19

## 2021-03-05 RX ADMIN — DOCUSATE SODIUM SCH MG: 100 CAPSULE, LIQUID FILLED ORAL at 19:53

## 2021-03-05 RX ADMIN — DOCUSATE SODIUM SCH MG: 100 CAPSULE, LIQUID FILLED ORAL at 07:40

## 2021-03-05 RX ADMIN — ACETAMINOPHEN PRN MG: 325 TABLET ORAL at 06:25

## 2021-03-05 RX ADMIN — ACETAMINOPHEN SCH MG: 500 TABLET ORAL at 07:40

## 2021-03-05 RX ADMIN — ACETAMINOPHEN SCH MG: 500 TABLET ORAL at 19:53

## 2021-03-06 VITALS — DIASTOLIC BLOOD PRESSURE: 73 MMHG | SYSTOLIC BLOOD PRESSURE: 107 MMHG

## 2021-03-06 RX ADMIN — ACETAMINOPHEN SCH MG: 500 TABLET ORAL at 20:06

## 2021-03-06 RX ADMIN — LEVOTHYROXINE SODIUM SCH MCG: 100 TABLET ORAL at 06:00

## 2021-03-06 RX ADMIN — ACETAMINOPHEN SCH MG: 500 TABLET ORAL at 08:25

## 2021-03-06 RX ADMIN — ACETAMINOPHEN SCH MG: 500 TABLET ORAL at 08:20

## 2021-03-06 RX ADMIN — DOCUSATE SODIUM SCH MG: 100 CAPSULE, LIQUID FILLED ORAL at 20:09

## 2021-03-06 RX ADMIN — DOCUSATE SODIUM SCH MG: 100 CAPSULE, LIQUID FILLED ORAL at 08:20

## 2021-03-07 VITALS — DIASTOLIC BLOOD PRESSURE: 79 MMHG | SYSTOLIC BLOOD PRESSURE: 115 MMHG

## 2021-03-07 RX ADMIN — DOCUSATE SODIUM SCH MG: 100 CAPSULE, LIQUID FILLED ORAL at 21:00

## 2021-03-07 RX ADMIN — ACETAMINOPHEN SCH MG: 500 TABLET ORAL at 08:08

## 2021-03-07 RX ADMIN — LEVOTHYROXINE SODIUM SCH MCG: 100 TABLET ORAL at 05:23

## 2021-03-07 RX ADMIN — DOCUSATE SODIUM SCH MG: 100 CAPSULE, LIQUID FILLED ORAL at 08:08

## 2021-03-07 RX ADMIN — ACETAMINOPHEN SCH MG: 500 TABLET ORAL at 21:00

## 2021-03-08 VITALS — DIASTOLIC BLOOD PRESSURE: 68 MMHG | SYSTOLIC BLOOD PRESSURE: 127 MMHG

## 2021-03-08 RX ADMIN — DOCUSATE SODIUM SCH MG: 100 CAPSULE, LIQUID FILLED ORAL at 20:03

## 2021-03-08 RX ADMIN — LEVOTHYROXINE SODIUM SCH MCG: 100 TABLET ORAL at 06:00

## 2021-03-08 RX ADMIN — DOCUSATE SODIUM SCH MG: 100 CAPSULE, LIQUID FILLED ORAL at 11:04

## 2021-03-08 RX ADMIN — ACETAMINOPHEN SCH MG: 500 TABLET ORAL at 11:03

## 2021-03-08 RX ADMIN — ACETAMINOPHEN SCH MG: 500 TABLET ORAL at 20:03

## 2021-03-09 VITALS — SYSTOLIC BLOOD PRESSURE: 148 MMHG | DIASTOLIC BLOOD PRESSURE: 62 MMHG

## 2021-03-09 RX ADMIN — LEVOTHYROXINE SODIUM SCH MCG: 100 TABLET ORAL at 06:17

## 2021-03-09 RX ADMIN — ACETAMINOPHEN SCH MG: 500 TABLET ORAL at 20:08

## 2021-03-09 RX ADMIN — ACETAMINOPHEN SCH MG: 500 TABLET ORAL at 07:54

## 2021-03-09 RX ADMIN — DOCUSATE SODIUM SCH MG: 100 CAPSULE, LIQUID FILLED ORAL at 20:08

## 2021-03-09 RX ADMIN — DOCUSATE SODIUM SCH MG: 100 CAPSULE, LIQUID FILLED ORAL at 07:54

## 2021-03-09 NOTE — IPNPDOC
Text Note


Date of Service


The patient was seen on 3/9/21.





NOTE


Subjective:


-No acute complaints


-Staff reporting lability in temperament, aggressive at times.





Objective


Physical Examination


General: Alert, Cooperative, No Acute Distress


ENT: Atraumatic, Pharynx Normal


Neck: Supple, no JVD


Chest: Clear to auscultation, Normal air movement, breathing comfortably on room

air


Heart: Rate Normal, Regular Rhythm, no m/r/g


Abdomen: Normal bowel sounds, soft, NTND


Extremity Exam: WWP, no LE edema


Neuro Exam: moves all 4 extremities, sometimes words are clear sometimes he 

mumbles





Labs: No new labs. Last labs reviewed.





Assessment:


85-year-old M with PMHx of hypothyroidism, hypertension, admitted for altered 

mental status with behavioral abnormality (aggression/violence) and diagnosed 

with dementia and now awaiting long term placement. 








Dementia (Lewy Body dementia/Parkinson's dementia?)


-Psych also thought there may be element of vascular dementia as well


-MRI showed no acute pathology


-AMS workup showed negative RPR, neg HIV, TSH low but T3 and T4 normal


-Restore scheduled haldol at bed time and continue seroquel BID


-PFS/SW working on placement





CKD Stage 2-3: stable. No recent labs or issues to cause concern


-avoid nephrotoxic medications





Anemia, macrocytic


-Vit B12, folate wnl


-No s/s of bleeding  





Hypothyroidism


-TSH low, T3 and T4 wnl


-C/w levothyroxine 100 mcg PO daily for now reduced from 125 mcg





DVT ppx: 


-Heparin sc





VS,Fishbone, I+O


VS, Fishbone, I+O





Vital Signs








  Date Time  Temp Pulse Resp B/P (MAP) Pulse Ox O2 Delivery O2 Flow Rate FiO2


 


3/9/21 06:00 98.4 64 18 148/62 (90) 100 Room Air  














I&O- Last 24 Hours up to 6 AM 


 


 3/9/21





 06:00


 


Intake Total 840 ml


 


Output Total 0 ml


 


Balance 840 ml

















CÉSAR MARTIN MD    Mar 9, 2021 15:08

## 2021-03-10 VITALS — DIASTOLIC BLOOD PRESSURE: 57 MMHG | SYSTOLIC BLOOD PRESSURE: 130 MMHG

## 2021-03-10 RX ADMIN — DOCUSATE SODIUM SCH MG: 100 CAPSULE, LIQUID FILLED ORAL at 21:12

## 2021-03-10 RX ADMIN — ACETAMINOPHEN SCH MG: 500 TABLET ORAL at 21:11

## 2021-03-10 RX ADMIN — ACETAMINOPHEN SCH MG: 500 TABLET ORAL at 08:21

## 2021-03-10 RX ADMIN — DOCUSATE SODIUM SCH MG: 100 CAPSULE, LIQUID FILLED ORAL at 08:21

## 2021-03-10 RX ADMIN — LEVOTHYROXINE SODIUM SCH MCG: 100 TABLET ORAL at 06:33

## 2021-03-11 VITALS — SYSTOLIC BLOOD PRESSURE: 130 MMHG | DIASTOLIC BLOOD PRESSURE: 56 MMHG

## 2021-03-11 RX ADMIN — LEVOTHYROXINE SODIUM SCH MCG: 100 TABLET ORAL at 05:35

## 2021-03-11 RX ADMIN — DOCUSATE SODIUM SCH MG: 100 CAPSULE, LIQUID FILLED ORAL at 20:00

## 2021-03-11 RX ADMIN — ACETAMINOPHEN SCH MG: 500 TABLET ORAL at 08:00

## 2021-03-11 RX ADMIN — ACETAMINOPHEN SCH MG: 500 TABLET ORAL at 20:01

## 2021-03-11 RX ADMIN — DOCUSATE SODIUM SCH MG: 100 CAPSULE, LIQUID FILLED ORAL at 08:00

## 2021-03-12 VITALS — DIASTOLIC BLOOD PRESSURE: 69 MMHG | SYSTOLIC BLOOD PRESSURE: 149 MMHG

## 2021-03-12 RX ADMIN — ACETAMINOPHEN SCH MG: 500 TABLET ORAL at 08:43

## 2021-03-12 RX ADMIN — DOCUSATE SODIUM SCH MG: 100 CAPSULE, LIQUID FILLED ORAL at 08:44

## 2021-03-12 RX ADMIN — ACETAMINOPHEN SCH MG: 500 TABLET ORAL at 19:52

## 2021-03-12 RX ADMIN — LEVOTHYROXINE SODIUM SCH MCG: 100 TABLET ORAL at 06:04

## 2021-03-12 RX ADMIN — DOCUSATE SODIUM SCH MG: 100 CAPSULE, LIQUID FILLED ORAL at 19:51

## 2021-03-13 VITALS — SYSTOLIC BLOOD PRESSURE: 144 MMHG | DIASTOLIC BLOOD PRESSURE: 68 MMHG

## 2021-03-13 RX ADMIN — DOCUSATE SODIUM SCH MG: 100 CAPSULE, LIQUID FILLED ORAL at 09:39

## 2021-03-13 RX ADMIN — ACETAMINOPHEN SCH MG: 500 TABLET ORAL at 09:39

## 2021-03-13 RX ADMIN — ACETAMINOPHEN SCH MG: 500 TABLET ORAL at 20:09

## 2021-03-13 RX ADMIN — LEVOTHYROXINE SODIUM SCH MCG: 100 TABLET ORAL at 05:54

## 2021-03-13 RX ADMIN — DOCUSATE SODIUM SCH MG: 100 CAPSULE, LIQUID FILLED ORAL at 20:08

## 2021-03-13 RX ADMIN — SENNOSIDES PRN TAB: 8.6 TABLET, FILM COATED ORAL at 20:09

## 2021-03-14 VITALS — DIASTOLIC BLOOD PRESSURE: 83 MMHG | SYSTOLIC BLOOD PRESSURE: 172 MMHG

## 2021-03-14 VITALS — DIASTOLIC BLOOD PRESSURE: 65 MMHG | SYSTOLIC BLOOD PRESSURE: 133 MMHG

## 2021-03-14 RX ADMIN — ACETAMINOPHEN SCH MG: 500 TABLET ORAL at 08:31

## 2021-03-14 RX ADMIN — SENNOSIDES PRN TAB: 8.6 TABLET, FILM COATED ORAL at 20:34

## 2021-03-14 RX ADMIN — LEVOTHYROXINE SODIUM SCH MCG: 100 TABLET ORAL at 05:52

## 2021-03-14 RX ADMIN — DOCUSATE SODIUM SCH MG: 100 CAPSULE, LIQUID FILLED ORAL at 20:34

## 2021-03-14 RX ADMIN — ACETAMINOPHEN SCH MG: 500 TABLET ORAL at 20:34

## 2021-03-14 RX ADMIN — DOCUSATE SODIUM SCH MG: 100 CAPSULE, LIQUID FILLED ORAL at 08:31

## 2021-03-14 NOTE — IPNPDOC
Text Note


Date of Service


The patient was seen on 3/14/21.





NOTE


Subjective: Complains of right heel pain. There is a corn. Pain worse during 

walking. 





Physical Examination


General: Alert, Cooperative, No Acute Distress


ENT: Atraumatic, Pharynx Normal


Neck: Supple, no JVD


Chest: Clear to auscultation, Normal air movement, breathing comfortably on room

air


Heart: Rate Normal, Regular Rhythm, no m/r/g


Abdomen: Normal bowel sounds, soft, NTND


Extremity Exam: WWP, no LE edema


Neuro Exam: moves all 4 extremities, sometimes words are clear sometimes he 

mumbles





Labs: No new labs. Last labs reviewed.  





Assessment:


85-year-old M with PMHx of hypothyroidism, hypertension, admitted for altered 

mental status with behavioral abnormality (aggression/violence) and diagnosed 

with dementia and now awaiting long term placement. 





Dementia (Lewy Body dementia/Parkinson's dementia?)


Psych also thought there may be element of vascular dementia as well


MRI showed no acute pathology


AMS workup showed negative RPR, neg HIV, TSH low but T3 and T4 normal


scheduled haldol at bed time and continue seroquel BID


PFS/SW working on placement





CKD Stage 2-3: 


stable


will get labs 





Anemia, macrocytic


Vit B12, folate wnl


No s/s of bleeding  





Hypothyroidism


TSH low, T3 and T4 wnl


C/w levothyroxine 100 mcg PO daily for now reduced from 125 mcg





H/O hypertension


seems to have resolved likely due to advancing age, sarcopenia, decreasing oral 

intake. 


taken off antihypertensive medications as was having hypotensive episodes. 





Heel pain pain during walking


tylenol bid. 





DVT ppx: 


Heparin sc





VS,Fishbone, I+O


VS, Fishbone, I+O





Vital Signs








  Date Time  Temp Pulse Resp B/P (MAP) Pulse Ox O2 Delivery O2 Flow Rate FiO2


 


3/14/21 06:09 98.6 66 20 172/83 (112) 95 Room Air  














I&O- Last 24 Hours up to 6 AM 


 


 3/14/21





 06:00


 


Intake Total 1150 ml


 


Output Total 0 ml


 


Balance 1150 ml

















MONAE SCHWARTZ MD                   Mar 14, 2021 08:09

## 2021-03-15 VITALS — DIASTOLIC BLOOD PRESSURE: 64 MMHG | SYSTOLIC BLOOD PRESSURE: 137 MMHG

## 2021-03-15 LAB
BUN SERPL-MCNC: 31 MG/DL (ref 7–18)
CALCIUM SERPL-MCNC: 8.9 MG/DL (ref 8.8–10.2)
CHLORIDE SERPL-SCNC: 108 MEQ/L (ref 98–107)
CO2 SERPL-SCNC: 27 MEQ/L (ref 21–32)
CREAT SERPL-MCNC: 1.39 MG/DL (ref 0.7–1.3)
GFR SERPL CREATININE-BSD FRML MDRD: 52 ML/MIN/{1.73_M2} (ref 35–?)
GLUCOSE SERPL-MCNC: 103 MG/DL (ref 70–100)
HCT VFR BLD AUTO: 36.2 % (ref 42–52)
HGB BLD-MCNC: 11.9 G/DL (ref 13.5–17.5)
MCH RBC QN AUTO: 32.2 PG (ref 27–33)
MCHC RBC AUTO-ENTMCNC: 32.9 G/DL (ref 32–36.5)
MCV RBC AUTO: 98.1 FL (ref 80–96)
PLATELET # BLD AUTO: 226 10^3/UL (ref 150–450)
POTASSIUM SERPL-SCNC: 5.3 MEQ/L (ref 3.5–5.1)
RBC # BLD AUTO: 3.69 10^6/UL (ref 4.3–6.1)
SODIUM SERPL-SCNC: 140 MEQ/L (ref 136–145)
WBC # BLD AUTO: 3.4 10^3/UL (ref 4–10)

## 2021-03-15 RX ADMIN — DOCUSATE SODIUM SCH MG: 100 CAPSULE, LIQUID FILLED ORAL at 08:42

## 2021-03-15 RX ADMIN — ACETAMINOPHEN PRN MG: 325 TABLET ORAL at 17:10

## 2021-03-15 RX ADMIN — DOCUSATE SODIUM SCH MG: 100 CAPSULE, LIQUID FILLED ORAL at 21:33

## 2021-03-15 RX ADMIN — LEVOTHYROXINE SODIUM SCH MCG: 100 TABLET ORAL at 05:28

## 2021-03-15 RX ADMIN — ACETAMINOPHEN SCH MG: 500 TABLET ORAL at 08:42

## 2021-03-15 RX ADMIN — ACETAMINOPHEN SCH MG: 500 TABLET ORAL at 21:33

## 2021-03-16 VITALS — DIASTOLIC BLOOD PRESSURE: 57 MMHG | SYSTOLIC BLOOD PRESSURE: 122 MMHG

## 2021-03-16 RX ADMIN — DOCUSATE SODIUM SCH MG: 100 CAPSULE, LIQUID FILLED ORAL at 08:28

## 2021-03-16 RX ADMIN — ACETAMINOPHEN SCH MG: 500 TABLET ORAL at 08:28

## 2021-03-16 RX ADMIN — ACETAMINOPHEN SCH MG: 500 TABLET ORAL at 20:13

## 2021-03-16 RX ADMIN — DOCUSATE SODIUM SCH MG: 100 CAPSULE, LIQUID FILLED ORAL at 20:14

## 2021-03-16 RX ADMIN — LEVOTHYROXINE SODIUM SCH MCG: 100 TABLET ORAL at 05:55

## 2021-03-17 VITALS — SYSTOLIC BLOOD PRESSURE: 133 MMHG | DIASTOLIC BLOOD PRESSURE: 59 MMHG

## 2021-03-17 RX ADMIN — ACETAMINOPHEN SCH MG: 500 TABLET ORAL at 08:24

## 2021-03-17 RX ADMIN — DOCUSATE SODIUM SCH MG: 100 CAPSULE, LIQUID FILLED ORAL at 20:17

## 2021-03-17 RX ADMIN — LEVOTHYROXINE SODIUM SCH MCG: 100 TABLET ORAL at 06:22

## 2021-03-17 RX ADMIN — DOCUSATE SODIUM SCH MG: 100 CAPSULE, LIQUID FILLED ORAL at 08:24

## 2021-03-17 RX ADMIN — ACETAMINOPHEN SCH MG: 500 TABLET ORAL at 20:18

## 2021-03-18 VITALS — SYSTOLIC BLOOD PRESSURE: 124 MMHG | DIASTOLIC BLOOD PRESSURE: 56 MMHG

## 2021-03-18 RX ADMIN — ACETAMINOPHEN SCH MG: 500 TABLET ORAL at 21:52

## 2021-03-18 RX ADMIN — DOCUSATE SODIUM SCH MG: 100 CAPSULE, LIQUID FILLED ORAL at 21:53

## 2021-03-18 RX ADMIN — SENNOSIDES PRN TAB: 8.6 TABLET, FILM COATED ORAL at 06:23

## 2021-03-18 RX ADMIN — LEVOTHYROXINE SODIUM SCH MCG: 100 TABLET ORAL at 06:23

## 2021-03-18 RX ADMIN — ACETAMINOPHEN SCH MG: 500 TABLET ORAL at 09:38

## 2021-03-18 RX ADMIN — DOCUSATE SODIUM SCH MG: 100 CAPSULE, LIQUID FILLED ORAL at 09:38

## 2021-03-19 VITALS — SYSTOLIC BLOOD PRESSURE: 145 MMHG | DIASTOLIC BLOOD PRESSURE: 61 MMHG

## 2021-03-19 RX ADMIN — ACETAMINOPHEN SCH MG: 500 TABLET ORAL at 09:01

## 2021-03-19 RX ADMIN — LEVOTHYROXINE SODIUM SCH MCG: 100 TABLET ORAL at 06:54

## 2021-03-19 RX ADMIN — DOCUSATE SODIUM SCH MG: 100 CAPSULE, LIQUID FILLED ORAL at 09:01

## 2021-03-19 RX ADMIN — ACETAMINOPHEN SCH MG: 500 TABLET ORAL at 20:03

## 2021-03-19 RX ADMIN — DOCUSATE SODIUM SCH MG: 100 CAPSULE, LIQUID FILLED ORAL at 20:02

## 2021-03-20 VITALS — DIASTOLIC BLOOD PRESSURE: 75 MMHG | SYSTOLIC BLOOD PRESSURE: 152 MMHG

## 2021-03-20 RX ADMIN — LEVOTHYROXINE SODIUM SCH MCG: 100 TABLET ORAL at 05:18

## 2021-03-20 RX ADMIN — DOCUSATE SODIUM SCH MG: 100 CAPSULE, LIQUID FILLED ORAL at 20:07

## 2021-03-20 RX ADMIN — DOCUSATE SODIUM SCH MG: 100 CAPSULE, LIQUID FILLED ORAL at 11:28

## 2021-03-20 RX ADMIN — ACETAMINOPHEN SCH MG: 500 TABLET ORAL at 20:08

## 2021-03-20 RX ADMIN — ACETAMINOPHEN SCH MG: 500 TABLET ORAL at 11:29

## 2021-03-21 VITALS — DIASTOLIC BLOOD PRESSURE: 59 MMHG | SYSTOLIC BLOOD PRESSURE: 120 MMHG

## 2021-03-21 LAB
BASOPHILS # BLD AUTO: 0 10^3/UL (ref 0–0.2)
BASOPHILS NFR BLD AUTO: 0.8 % (ref 0–1)
BUN SERPL-MCNC: 33 MG/DL (ref 7–18)
CALCIUM SERPL-MCNC: 8.7 MG/DL (ref 8.8–10.2)
CHLORIDE SERPL-SCNC: 106 MEQ/L (ref 98–107)
CO2 SERPL-SCNC: 29 MEQ/L (ref 21–32)
CREAT SERPL-MCNC: 1.24 MG/DL (ref 0.7–1.3)
EOSINOPHIL # BLD AUTO: 0.3 10^3/UL (ref 0–0.5)
EOSINOPHIL NFR BLD AUTO: 5.4 % (ref 0–3)
GFR SERPL CREATININE-BSD FRML MDRD: 59.3 ML/MIN/{1.73_M2} (ref 35–?)
GLUCOSE SERPL-MCNC: 109 MG/DL (ref 70–100)
HCT VFR BLD AUTO: 35.3 % (ref 42–52)
HGB BLD-MCNC: 11.8 G/DL (ref 13.5–17.5)
LYMPHOCYTES # BLD AUTO: 1.5 10^3/UL (ref 1.5–5)
LYMPHOCYTES NFR BLD AUTO: 30.3 % (ref 24–44)
MCH RBC QN AUTO: 32.5 PG (ref 27–33)
MCHC RBC AUTO-ENTMCNC: 33.4 G/DL (ref 32–36.5)
MCV RBC AUTO: 97.2 FL (ref 80–96)
MONOCYTES # BLD AUTO: 0.6 10^3/UL (ref 0–0.8)
MONOCYTES NFR BLD AUTO: 11.6 % (ref 2–8)
NEUTROPHILS # BLD AUTO: 2.6 10^3/UL (ref 1.5–8.5)
NEUTROPHILS NFR BLD AUTO: 51.5 % (ref 36–66)
PLATELET # BLD AUTO: 251 10^3/UL (ref 150–450)
POTASSIUM SERPL-SCNC: 5.6 MEQ/L (ref 3.5–5.1)
RBC # BLD AUTO: 3.63 10^6/UL (ref 4.3–6.1)
SODIUM SERPL-SCNC: 137 MEQ/L (ref 136–145)
T4 FREE SERPL-MCNC: 0.89 NG/DL (ref 0.76–1.46)
TSH SERPL DL<=0.005 MIU/L-ACNC: 0.29 UIU/ML (ref 0.36–3.74)
WBC # BLD AUTO: 5 10^3/UL (ref 4–10)

## 2021-03-21 RX ADMIN — LEVOTHYROXINE SODIUM SCH MCG: 100 TABLET ORAL at 06:33

## 2021-03-21 RX ADMIN — ACETAMINOPHEN SCH MG: 500 TABLET ORAL at 20:23

## 2021-03-21 RX ADMIN — DOCUSATE SODIUM SCH MG: 100 CAPSULE, LIQUID FILLED ORAL at 07:55

## 2021-03-21 RX ADMIN — DOCUSATE SODIUM SCH MG: 100 CAPSULE, LIQUID FILLED ORAL at 20:23

## 2021-03-21 RX ADMIN — ACETAMINOPHEN SCH MG: 500 TABLET ORAL at 07:55

## 2021-03-21 NOTE — IPNPDOC
Text Note


Date of Service


The patient was seen on 3/21/21.





NOTE


Subjective: No issues this week. calm and cooperative, walking in the corridor 

with staff. Heel pain , hip pain controlled no problem with ambulation. 





Physical Examination


General: Alert, Cooperative, No Acute Distress


ENT: Atraumatic, Pharynx Normal


Neck: Supple, no JVD


Chest: Clear to auscultation, Normal air movement, breathing comfortably on room

air


Heart: Rate Normal, Regular Rhythm, no m/r/g


Abdomen: Normal bowel sounds, soft, NTND


Extremity Exam: WWP, no LE edema


Neuro Exam: moves all 4 extremities, sometimes words are clear sometimes he 

mumbles





Labs: No new labs. labs ordered.





Assessment:


85-year-old M with PMHx of hypothyroidism, hypertension, admitted for altered 

mental status with behavioral abnormality (aggression/violence) and diagnosed 

with dementia and now awaiting long term placement. 





Dementia (Lewy Body dementia/Parkinson's dementia?)


Psych also thought there may be element of vascular dementia as well


MRI showed no acute pathology


AMS workup showed negative RPR, neg HIV, TSH low but T3 and T4 normal


scheduled haldol 1.5 mg at bed time and continue seroquel BID


PFS/SW working on placement





CKD Stage 2-3: 


stable


will get labs 





Anemia, macrocytic


Vit B12, folate wnl


No s/s of bleeding  





Hypothyroidism


TSH low, T3 and T4 wnl


C/w levothyroxine 100 mcg PO daily for now reduced from 125 mcg





H/O hypertension


seems to have resolved likely due to advancing age, sarcopenia, decreasing oral 

intake. 


taken off antihypertensive medications as was having hypotensive episodes. 





Heel pain pain during walking


tylenol bid. 





DVT ppx: 


Heparin sc





VS,Fishbone, I+O


VS, Fishbone, I+O





Vital Signs








  Date Time  Temp Pulse Resp B/P (MAP) Pulse Ox O2 Delivery O2 Flow Rate FiO2


 


3/21/21 06:35 95.8 52 18 120/59 (79) 96 Room Air  














I&O- Last 24 Hours up to 6 AM 


 


 3/21/21





 06:00


 


Intake Total 960 ml


 


Output Total 0 ml


 


Balance 960 ml

















MONAE SCHWARTZ MD                   Mar 21, 2021 10:51

## 2021-03-22 VITALS — SYSTOLIC BLOOD PRESSURE: 163 MMHG | DIASTOLIC BLOOD PRESSURE: 81 MMHG

## 2021-03-22 LAB
BUN SERPL-MCNC: 34 MG/DL (ref 7–18)
CALCIUM SERPL-MCNC: 9.3 MG/DL (ref 8.8–10.2)
CHLORIDE SERPL-SCNC: 107 MEQ/L (ref 98–107)
CO2 SERPL-SCNC: 29 MEQ/L (ref 21–32)
CREAT SERPL-MCNC: 1.39 MG/DL (ref 0.7–1.3)
GFR SERPL CREATININE-BSD FRML MDRD: 52 ML/MIN/{1.73_M2} (ref 35–?)
GLUCOSE SERPL-MCNC: 108 MG/DL (ref 70–100)
HCT VFR BLD AUTO: 36.5 % (ref 42–52)
HGB BLD-MCNC: 12.1 G/DL (ref 13.5–17.5)
MCH RBC QN AUTO: 32.5 PG (ref 27–33)
MCHC RBC AUTO-ENTMCNC: 33.2 G/DL (ref 32–36.5)
MCV RBC AUTO: 98.1 FL (ref 80–96)
PLATELET # BLD AUTO: 248 10^3/UL (ref 150–450)
POTASSIUM SERPL-SCNC: 5.3 MEQ/L (ref 3.5–5.1)
RBC # BLD AUTO: 3.72 10^6/UL (ref 4.3–6.1)
SODIUM SERPL-SCNC: 139 MEQ/L (ref 136–145)
WBC # BLD AUTO: 5 10^3/UL (ref 4–10)

## 2021-03-22 RX ADMIN — DOCUSATE SODIUM SCH MG: 100 CAPSULE, LIQUID FILLED ORAL at 20:45

## 2021-03-22 RX ADMIN — DOCUSATE SODIUM SCH MG: 100 CAPSULE, LIQUID FILLED ORAL at 08:02

## 2021-03-22 RX ADMIN — LEVOTHYROXINE SODIUM SCH MCG: 100 TABLET ORAL at 06:26

## 2021-03-22 RX ADMIN — ACETAMINOPHEN SCH MG: 500 TABLET ORAL at 20:45

## 2021-03-22 RX ADMIN — ACETAMINOPHEN SCH MG: 500 TABLET ORAL at 08:03

## 2021-03-23 VITALS — SYSTOLIC BLOOD PRESSURE: 133 MMHG | DIASTOLIC BLOOD PRESSURE: 62 MMHG

## 2021-03-23 RX ADMIN — ACETAMINOPHEN SCH MG: 500 TABLET ORAL at 20:32

## 2021-03-23 RX ADMIN — LEVOTHYROXINE SODIUM SCH MCG: 100 TABLET ORAL at 06:11

## 2021-03-23 RX ADMIN — DOCUSATE SODIUM SCH MG: 100 CAPSULE, LIQUID FILLED ORAL at 20:31

## 2021-03-23 RX ADMIN — DOCUSATE SODIUM SCH MG: 100 CAPSULE, LIQUID FILLED ORAL at 08:42

## 2021-03-23 RX ADMIN — ACETAMINOPHEN SCH MG: 500 TABLET ORAL at 08:43

## 2021-03-24 VITALS — DIASTOLIC BLOOD PRESSURE: 70 MMHG | SYSTOLIC BLOOD PRESSURE: 130 MMHG

## 2021-03-24 RX ADMIN — DOCUSATE SODIUM SCH MG: 100 CAPSULE, LIQUID FILLED ORAL at 20:22

## 2021-03-24 RX ADMIN — ACETAMINOPHEN SCH MG: 500 TABLET ORAL at 08:59

## 2021-03-24 RX ADMIN — LEVOTHYROXINE SODIUM SCH MCG: 100 TABLET ORAL at 06:15

## 2021-03-24 RX ADMIN — DOCUSATE SODIUM SCH MG: 100 CAPSULE, LIQUID FILLED ORAL at 08:59

## 2021-03-24 RX ADMIN — ACETAMINOPHEN SCH MG: 500 TABLET ORAL at 20:22

## 2021-03-25 VITALS — DIASTOLIC BLOOD PRESSURE: 66 MMHG | SYSTOLIC BLOOD PRESSURE: 129 MMHG

## 2021-03-25 RX ADMIN — LEVOTHYROXINE SODIUM SCH MCG: 100 TABLET ORAL at 05:11

## 2021-03-25 RX ADMIN — DOCUSATE SODIUM SCH MG: 100 CAPSULE, LIQUID FILLED ORAL at 20:24

## 2021-03-25 RX ADMIN — ACETAMINOPHEN SCH MG: 500 TABLET ORAL at 10:57

## 2021-03-25 RX ADMIN — DOCUSATE SODIUM SCH MG: 100 CAPSULE, LIQUID FILLED ORAL at 10:57

## 2021-03-25 RX ADMIN — ACETAMINOPHEN SCH MG: 500 TABLET ORAL at 20:24

## 2021-03-26 VITALS — DIASTOLIC BLOOD PRESSURE: 63 MMHG | SYSTOLIC BLOOD PRESSURE: 127 MMHG

## 2021-03-26 VITALS — DIASTOLIC BLOOD PRESSURE: 69 MMHG | SYSTOLIC BLOOD PRESSURE: 149 MMHG

## 2021-03-26 RX ADMIN — ACETAMINOPHEN SCH MG: 500 TABLET ORAL at 08:22

## 2021-03-26 RX ADMIN — LEVOTHYROXINE SODIUM SCH MCG: 100 TABLET ORAL at 06:05

## 2021-03-26 RX ADMIN — DOCUSATE SODIUM SCH MG: 100 CAPSULE, LIQUID FILLED ORAL at 20:43

## 2021-03-26 RX ADMIN — DOCUSATE SODIUM SCH MG: 100 CAPSULE, LIQUID FILLED ORAL at 08:19

## 2021-03-26 RX ADMIN — ACETAMINOPHEN SCH MG: 500 TABLET ORAL at 20:44

## 2021-03-27 VITALS — SYSTOLIC BLOOD PRESSURE: 152 MMHG | DIASTOLIC BLOOD PRESSURE: 68 MMHG

## 2021-03-27 VITALS — SYSTOLIC BLOOD PRESSURE: 128 MMHG | DIASTOLIC BLOOD PRESSURE: 78 MMHG

## 2021-03-27 RX ADMIN — DOCUSATE SODIUM SCH MG: 100 CAPSULE, LIQUID FILLED ORAL at 08:35

## 2021-03-27 RX ADMIN — DOCUSATE SODIUM SCH MG: 100 CAPSULE, LIQUID FILLED ORAL at 20:06

## 2021-03-27 RX ADMIN — ACETAMINOPHEN SCH MG: 500 TABLET ORAL at 08:35

## 2021-03-27 RX ADMIN — ACETAMINOPHEN SCH MG: 500 TABLET ORAL at 20:06

## 2021-03-27 RX ADMIN — LEVOTHYROXINE SODIUM SCH MCG: 100 TABLET ORAL at 05:43

## 2021-03-28 VITALS — SYSTOLIC BLOOD PRESSURE: 167 MMHG | DIASTOLIC BLOOD PRESSURE: 85 MMHG

## 2021-03-28 RX ADMIN — DOCUSATE SODIUM SCH MG: 100 CAPSULE, LIQUID FILLED ORAL at 20:12

## 2021-03-28 RX ADMIN — DOCUSATE SODIUM SCH MG: 100 CAPSULE, LIQUID FILLED ORAL at 08:03

## 2021-03-28 RX ADMIN — LEVOTHYROXINE SODIUM SCH MCG: 100 TABLET ORAL at 07:02

## 2021-03-28 RX ADMIN — ACETAMINOPHEN SCH MG: 500 TABLET ORAL at 08:04

## 2021-03-28 RX ADMIN — ACETAMINOPHEN SCH MG: 500 TABLET ORAL at 20:12

## 2021-03-28 NOTE — IPNPDOC
Text Note


Date of Service


The patient was seen on 3/28/21.





NOTE


Subjective: No issues this week. Calm and cooperative, walking in the corridor 

with staff. Heel pain , hip pain controlled no problem with ambulation. 





Physical Examination


General: Alert, Cooperative, No Acute Distress


ENT: Atraumatic, Pharynx Normal


Neck: Supple, no JVD


Chest: Clear to auscultation, Normal air movement, breathing comfortably on room

air


Heart: Rate Normal, Regular Rhythm, no m/r/g


Abdomen: Normal bowel sounds, soft, NTND


Extremity Exam: WWP, no LE edema


Neuro Exam: moves all 4 extremities, sometimes words are clear sometimes he 

mumbles





Labs: No new labs available at this time





Assessment:


85-year-old M with PMHx of hypothyroidism, hypertension, admitted for altered 

mental status with behavioral abnormality (aggression/violence) and diagnosed 

with dementia and now awaiting long term placement. 





Dementia (Lewy Body dementia/Parkinson's dementia?)


Psych also thought there may be element of vascular dementia as well


MRI showed no acute pathology


AMS workup showed negative RPR, neg HIV, TSH low but T3 and T4 normal


scheduled haldol 1.5 mg at bed time and continue seroquel BID


PFS/SW working on placement





CKD Stage 3: 


stable





Anemia, macrocytic


Vit B12, folate wnl


No s/s of bleeding  





Hypothyroidism


TSH low, T3 and T4 wnl


C/w levothyroxine 100 mcg PO daily for now reduced from 125 mcg





H/O hypertension


seems to have resolved likely due to advancing age, sarcopenia, decreasing oral 

intake. 


taken off antihypertensive medications as was having hypotensive episodes. 





Heel pain pain during walking


tylenol bid. 





DVT ppx: 


Heparin sc





VS,Fishbone, I+O


VS, Fishbone, I+O





Vital Signs








  Date Time  Temp Pulse Resp B/P (MAP) Pulse Ox O2 Delivery O2 Flow Rate FiO2


 


3/28/21 07:01 98.3 79 18 167/85 (112) 98 Room Air  














I&O- Last 24 Hours up to 6 AM 


 


 3/28/21





 06:00


 


Intake Total 1860 ml


 


Balance 1860 ml

















MONAE SCHWARTZ MD                   Mar 28, 2021 11:04

## 2021-03-29 VITALS — DIASTOLIC BLOOD PRESSURE: 62 MMHG | SYSTOLIC BLOOD PRESSURE: 146 MMHG

## 2021-03-29 LAB
BUN SERPL-MCNC: 31 MG/DL (ref 7–18)
CALCIUM SERPL-MCNC: 9.2 MG/DL (ref 8.8–10.2)
CHLORIDE SERPL-SCNC: 108 MEQ/L (ref 98–107)
CO2 SERPL-SCNC: 27 MEQ/L (ref 21–32)
CREAT SERPL-MCNC: 1.29 MG/DL (ref 0.7–1.3)
GFR SERPL CREATININE-BSD FRML MDRD: 56.6 ML/MIN/{1.73_M2} (ref 35–?)
GLUCOSE SERPL-MCNC: 89 MG/DL (ref 70–100)
HCT VFR BLD AUTO: 38.1 % (ref 42–52)
HGB BLD-MCNC: 12.8 G/DL (ref 13.5–17.5)
MCH RBC QN AUTO: 32.4 PG (ref 27–33)
MCHC RBC AUTO-ENTMCNC: 33.6 G/DL (ref 32–36.5)
MCV RBC AUTO: 96.5 FL (ref 80–96)
PLATELET # BLD AUTO: 235 10^3/UL (ref 150–450)
POTASSIUM SERPL-SCNC: 4.8 MEQ/L (ref 3.5–5.1)
RBC # BLD AUTO: 3.95 10^6/UL (ref 4.3–6.1)
SODIUM SERPL-SCNC: 139 MEQ/L (ref 136–145)
WBC # BLD AUTO: 4 10^3/UL (ref 4–10)

## 2021-03-29 RX ADMIN — DOCUSATE SODIUM SCH MG: 100 CAPSULE, LIQUID FILLED ORAL at 20:42

## 2021-03-29 RX ADMIN — ACETAMINOPHEN SCH MG: 500 TABLET ORAL at 20:41

## 2021-03-29 RX ADMIN — LEVOTHYROXINE SODIUM SCH MCG: 100 TABLET ORAL at 06:43

## 2021-03-29 RX ADMIN — DOCUSATE SODIUM SCH MG: 100 CAPSULE, LIQUID FILLED ORAL at 09:14

## 2021-03-29 RX ADMIN — ACETAMINOPHEN SCH MG: 500 TABLET ORAL at 09:14

## 2021-03-30 VITALS — SYSTOLIC BLOOD PRESSURE: 142 MMHG | DIASTOLIC BLOOD PRESSURE: 61 MMHG

## 2021-03-30 RX ADMIN — DOCUSATE SODIUM SCH MG: 100 CAPSULE, LIQUID FILLED ORAL at 20:25

## 2021-03-30 RX ADMIN — DOCUSATE SODIUM SCH MG: 100 CAPSULE, LIQUID FILLED ORAL at 08:14

## 2021-03-30 RX ADMIN — ACETAMINOPHEN SCH MG: 500 TABLET ORAL at 08:14

## 2021-03-30 RX ADMIN — LEVOTHYROXINE SODIUM SCH MCG: 100 TABLET ORAL at 06:46

## 2021-03-30 RX ADMIN — ACETAMINOPHEN SCH MG: 500 TABLET ORAL at 20:25

## 2021-03-31 VITALS — DIASTOLIC BLOOD PRESSURE: 74 MMHG | SYSTOLIC BLOOD PRESSURE: 154 MMHG

## 2021-03-31 RX ADMIN — DOCUSATE SODIUM SCH MG: 100 CAPSULE, LIQUID FILLED ORAL at 08:28

## 2021-03-31 RX ADMIN — ACETAMINOPHEN PRN MG: 325 TABLET ORAL at 05:21

## 2021-03-31 RX ADMIN — DOCUSATE SODIUM SCH MG: 100 CAPSULE, LIQUID FILLED ORAL at 20:35

## 2021-03-31 RX ADMIN — ACETAMINOPHEN SCH MG: 500 TABLET ORAL at 21:00

## 2021-03-31 RX ADMIN — LEVOTHYROXINE SODIUM SCH MCG: 100 TABLET ORAL at 05:16

## 2021-03-31 RX ADMIN — ACETAMINOPHEN SCH MG: 500 TABLET ORAL at 08:28

## 2021-04-01 VITALS — SYSTOLIC BLOOD PRESSURE: 143 MMHG | DIASTOLIC BLOOD PRESSURE: 69 MMHG

## 2021-04-01 RX ADMIN — ACETAMINOPHEN SCH MG: 500 TABLET ORAL at 08:02

## 2021-04-01 RX ADMIN — ACETAMINOPHEN SCH MG: 500 TABLET ORAL at 20:16

## 2021-04-01 RX ADMIN — DOCUSATE SODIUM SCH MG: 100 CAPSULE, LIQUID FILLED ORAL at 08:02

## 2021-04-01 RX ADMIN — LEVOTHYROXINE SODIUM SCH MCG: 100 TABLET ORAL at 05:52

## 2021-04-01 RX ADMIN — DOCUSATE SODIUM SCH MG: 100 CAPSULE, LIQUID FILLED ORAL at 20:16

## 2021-04-02 VITALS — SYSTOLIC BLOOD PRESSURE: 110 MMHG | DIASTOLIC BLOOD PRESSURE: 53 MMHG

## 2021-04-02 RX ADMIN — LEVOTHYROXINE SODIUM SCH MCG: 100 TABLET ORAL at 06:00

## 2021-04-02 RX ADMIN — ACETAMINOPHEN SCH MG: 500 TABLET ORAL at 08:27

## 2021-04-02 RX ADMIN — DOCUSATE SODIUM SCH MG: 100 CAPSULE, LIQUID FILLED ORAL at 08:26

## 2021-04-02 RX ADMIN — DOCUSATE SODIUM SCH MG: 100 CAPSULE, LIQUID FILLED ORAL at 22:20

## 2021-04-02 RX ADMIN — ACETAMINOPHEN SCH MG: 500 TABLET ORAL at 22:19

## 2021-04-03 VITALS — SYSTOLIC BLOOD PRESSURE: 123 MMHG | DIASTOLIC BLOOD PRESSURE: 55 MMHG

## 2021-04-03 RX ADMIN — LEVOTHYROXINE SODIUM SCH MCG: 100 TABLET ORAL at 06:56

## 2021-04-03 RX ADMIN — ACETAMINOPHEN SCH MG: 500 TABLET ORAL at 08:15

## 2021-04-03 RX ADMIN — DOCUSATE SODIUM SCH MG: 100 CAPSULE, LIQUID FILLED ORAL at 08:15

## 2021-04-03 RX ADMIN — DOCUSATE SODIUM SCH MG: 100 CAPSULE, LIQUID FILLED ORAL at 20:45

## 2021-04-03 RX ADMIN — ACETAMINOPHEN SCH MG: 500 TABLET ORAL at 20:45

## 2021-04-04 VITALS — SYSTOLIC BLOOD PRESSURE: 150 MMHG | DIASTOLIC BLOOD PRESSURE: 72 MMHG

## 2021-04-04 VITALS — SYSTOLIC BLOOD PRESSURE: 152 MMHG | DIASTOLIC BLOOD PRESSURE: 73 MMHG

## 2021-04-04 RX ADMIN — ACETAMINOPHEN SCH MG: 500 TABLET ORAL at 08:11

## 2021-04-04 RX ADMIN — LEVOTHYROXINE SODIUM SCH MCG: 100 TABLET ORAL at 06:32

## 2021-04-04 RX ADMIN — DOCUSATE SODIUM SCH MG: 100 CAPSULE, LIQUID FILLED ORAL at 08:11

## 2021-04-04 RX ADMIN — ACETAMINOPHEN SCH MG: 500 TABLET ORAL at 19:52

## 2021-04-04 RX ADMIN — DOCUSATE SODIUM SCH MG: 100 CAPSULE, LIQUID FILLED ORAL at 19:52

## 2021-04-05 VITALS — DIASTOLIC BLOOD PRESSURE: 74 MMHG | SYSTOLIC BLOOD PRESSURE: 163 MMHG

## 2021-04-05 LAB
BUN SERPL-MCNC: 33 MG/DL (ref 7–18)
CALCIUM SERPL-MCNC: 9.2 MG/DL (ref 8.8–10.2)
CHLORIDE SERPL-SCNC: 108 MEQ/L (ref 98–107)
CO2 SERPL-SCNC: 26 MEQ/L (ref 21–32)
CREAT SERPL-MCNC: 1.39 MG/DL (ref 0.7–1.3)
GFR SERPL CREATININE-BSD FRML MDRD: 52 ML/MIN/{1.73_M2} (ref 35–?)
GLUCOSE SERPL-MCNC: 113 MG/DL (ref 70–100)
HCT VFR BLD AUTO: 37.1 % (ref 42–52)
HGB BLD-MCNC: 12.4 G/DL (ref 13.5–17.5)
MCH RBC QN AUTO: 32.8 PG (ref 27–33)
MCHC RBC AUTO-ENTMCNC: 33.4 G/DL (ref 32–36.5)
MCV RBC AUTO: 98.1 FL (ref 80–96)
PLATELET # BLD AUTO: 216 10^3/UL (ref 150–450)
POTASSIUM SERPL-SCNC: 4.9 MEQ/L (ref 3.5–5.1)
RBC # BLD AUTO: 3.78 10^6/UL (ref 4.3–6.1)
SODIUM SERPL-SCNC: 139 MEQ/L (ref 136–145)
WBC # BLD AUTO: 4.1 10^3/UL (ref 4–10)

## 2021-04-05 RX ADMIN — DOCUSATE SODIUM SCH MG: 100 CAPSULE, LIQUID FILLED ORAL at 07:39

## 2021-04-05 RX ADMIN — DOCUSATE SODIUM SCH MG: 100 CAPSULE, LIQUID FILLED ORAL at 20:43

## 2021-04-05 RX ADMIN — ACETAMINOPHEN SCH MG: 500 TABLET ORAL at 20:43

## 2021-04-05 RX ADMIN — ACETAMINOPHEN SCH MG: 500 TABLET ORAL at 07:40

## 2021-04-05 RX ADMIN — LEVOTHYROXINE SODIUM SCH MCG: 100 TABLET ORAL at 06:49

## 2021-04-06 VITALS — SYSTOLIC BLOOD PRESSURE: 144 MMHG | DIASTOLIC BLOOD PRESSURE: 73 MMHG

## 2021-04-06 RX ADMIN — ACETAMINOPHEN SCH MG: 500 TABLET ORAL at 20:28

## 2021-04-06 RX ADMIN — DOCUSATE SODIUM SCH MG: 100 CAPSULE, LIQUID FILLED ORAL at 20:29

## 2021-04-06 RX ADMIN — LEVOTHYROXINE SODIUM SCH MCG: 100 TABLET ORAL at 06:50

## 2021-04-06 RX ADMIN — ACETAMINOPHEN SCH MG: 500 TABLET ORAL at 08:36

## 2021-04-06 RX ADMIN — DOCUSATE SODIUM SCH MG: 100 CAPSULE, LIQUID FILLED ORAL at 08:36

## 2021-04-06 NOTE — IPNPDOC
Date Seen


The patient was seen on 4/6/21.





Progress Note


Subjective: 


Became frustrated before weekend, hit head on wall. No issues overnight. Denies 

chest pain, n/v/d, fevers, chills. Cooperative. 





Physical Examination


VS: Please see below 


General: Alert, Cooperative, No Acute Distress


ENT: Atraumatic, Pharynx Normal


Neck: Supple, no JVD


Chest: Clear to auscultation, Normal air movement, breathing comfortably on room

air


Heart: Rate Normal, Regular Rhythm, no m/r/g


Abdomen: Normal bowel sounds, soft, NTND


Extremity Exam: WWP, no LE edema


Neuro Exam: moves all 4 extremities, no focal deficits 





Laboratory: 


Labs from 4/5/21 show no acute issues 





Assessment:


85-year-old M with PMHx of hypothyroidism, hypertension, admitted for altered 

mental status with behavioral abnormality (aggression/violence) and diagnosed 

with dementia and now awaiting long term placement. 





Plan: 





Dementia likley 2/2 to Lewy Body dementia/Parkinson's dementia? and/vs. likely 

vascular dementia


Confused intermittently, baseline. Occasional episodes of agitation, 

frustrations. 


MRI showed no acute pathology


AMS workup showed negative RPR, neg HIV, TSH low but T3 and T4 normal


C/w haldol 1.5 mg at bed time and continue seroquel BID


PFS/SW working on placement





CKD Stage 3: 


Stable





Anemia, macrocytic


Vit B12, folate wnl


No s/s of bleeding  





Hypothyroidism


TSH low, T3 and T4 wnl


C/w levothyroxine 100 mcg PO daily for now reduced from 125 mcg





H/O hypertension


No longer on antihypertensive medications due to low BP here 





Heel pain pain during walking


Improving 


tylenol bid. 





DVT ppx: 


Heparin sc





DISPOSITION: Pending placement.





VS, I&O, 24H, Fishbone


Vital Signs/I&O





Vital Signs








  Date Time  Temp Pulse Resp B/P (MAP) Pulse Ox O2 Delivery O2 Flow Rate FiO2


 


4/6/21 06:00 98.4 63 18 144/73 (96) 98 Room Air  














I&O- Last 24 Hours up to 6 AM 


 


 4/6/21





 06:00


 


Intake Total 1080 ml


 


Output Total 0 ml


 


Balance 1080 ml











Current Medications





Current Medications








 Medications


  (Trade)  Dose


 Ordered  Sig/Radha


 Route


 PRN Reason  Start Time


 Stop Time Status Last Admin


Dose Admin


 


 Acetaminophen


  (Tylenol Tab)  500 mg  BID


 PO


   2/27/21 21:00


    4/6/21 08:36





 


 Acetaminophen


  (Tylenol Tab)  650 mg  Q4HP  PRN


 PO


 PAIN OR FEVER  2/19/21 15:30


    3/31/21 05:21





 


 Amlodipine


 Besylate


  (Norvasc)  5 mg  DAILY


 PO


   2/13/21 09:00


 2/24/21 18:14 DC 2/24/21 10:04





 


 Diphenhydramine


 HCl


  (Benadryl)  50 mg  ASDIRECTED  PRN


 IM


 ANAPHYLAXIS  3/11/21 00:00


 3/11/21 23:59 DC  





 


 Docusate Sodium


  (Colace)  100 mg  BID


 PO


   1/31/21 09:00


    4/6/21 08:36





 


 Epinephrine HCl


  (Adrenalin)  0.3 mg  ASDIRECTED  PRN


 IM


 ANAPHYLAXIS  3/11/21 00:00


 3/11/21 23:59 DC  





 


 Haloperidol


  (Haldol)  0.5 mg  TIDP  PRN


 PO


 ANXIETY/AGITATION  1/15/21 19:30


 2/4/21 12:35 DC 1/22/21 23:41





 


 Haloperidol


  (Haldol)  1 mg  QHS


 PO


   1/15/21 21:00


 1/21/21 09:32 DC 1/20/21 20:27





 


 Haloperidol


  (Haldol)  1.5 mg  QHS


 PO


   1/21/21 21:00


 2/17/21 08:40 DC 2/16/21 20:53





 


 Haloperidol


  (Haldol)  1.5 mg  QHS


 PO


   3/9/21 21:00


    4/5/21 20:44





 


 Heparin Sodium


  (Porcine)


  (Heparin)  5,000 units  Q12H


 SQ


   1/14/21 21:00


 2/17/21 08:39 DC 2/16/21 20:53





 


 Home Med


  (Med Rec


 Complete!)    ASDIRECTED


 XX


   1/14/21 12:15


 1/14/21 12:07 DC  





 


 Levothyroxine


 Sodium


  (Synthroid)  100 mcg  DAILY@06


 PO


   1/15/21 06:00


    4/6/21 06:50





 


 Quetiapine


 Fumarate


  (SEROquel)  12.5 mg  BID


 PO


   2/25/21 21:00


    4/6/21 08:36





 


 Quetiapine


 Fumarate


  (SEROquel)  25 mg  BID


 PO


   1/14/21 21:00


 2/25/21 15:21 DC 2/25/21 10:08





 


 Senna


  (Senokot)  2 tab  DAILYPRN  PRN


 PO


 CONSTIPATION  1/31/21 09:00


    3/18/21 06:23





 


 Sodium Chloride  1,000 ml @ 


 75 mls/hr  [id number]


 IV


   2/25/21 10:00


 2/26/21 10:05 DC 2/26/21 05:33





 


 Sodium Chloride  1,000 ml @ 


 100 mls/hr  Q10H


 IV


   1/14/21 12:15


 1/14/21 20:53 DC 1/14/21 14:47





 


 Sodium Chloride  1,000 ml @ 


 100 mls/hr  Q10H


 IV


   1/16/21 12:15


 1/18/21 07:45 DC 1/18/21 06:43





 


 Sodium Chloride  1,000 ml @ 


 100 mls/hr  Q10H


 IV


   3/4/21 08:20


 3/4/21 18:19 DC 3/4/21 08:46





 


 Trazodone HCl


  (Desyrel)  50 mg  QHS


 PO


   1/14/21 21:00


 1/14/21 14:33 DC  














Allergies


Coded Allergies:  


     No Known Drug Allergies (Verified  Allergy, Unknown, 1/14/21)











Bianka Matt MD             Apr 6, 2021 13:19

## 2021-04-07 VITALS — SYSTOLIC BLOOD PRESSURE: 142 MMHG | DIASTOLIC BLOOD PRESSURE: 74 MMHG

## 2021-04-07 RX ADMIN — ACETAMINOPHEN SCH MG: 500 TABLET ORAL at 22:47

## 2021-04-07 RX ADMIN — LEVOTHYROXINE SODIUM SCH MCG: 100 TABLET ORAL at 05:46

## 2021-04-07 RX ADMIN — ACETAMINOPHEN SCH MG: 500 TABLET ORAL at 08:32

## 2021-04-07 RX ADMIN — DOCUSATE SODIUM SCH MG: 100 CAPSULE, LIQUID FILLED ORAL at 22:47

## 2021-04-07 RX ADMIN — DOCUSATE SODIUM SCH MG: 100 CAPSULE, LIQUID FILLED ORAL at 08:32

## 2021-04-08 VITALS — DIASTOLIC BLOOD PRESSURE: 62 MMHG | SYSTOLIC BLOOD PRESSURE: 139 MMHG

## 2021-04-08 RX ADMIN — ACETAMINOPHEN SCH MG: 500 TABLET ORAL at 21:20

## 2021-04-08 RX ADMIN — ACETAMINOPHEN SCH MG: 500 TABLET ORAL at 07:56

## 2021-04-08 RX ADMIN — DOCUSATE SODIUM SCH MG: 100 CAPSULE, LIQUID FILLED ORAL at 07:56

## 2021-04-08 RX ADMIN — DOCUSATE SODIUM SCH MG: 100 CAPSULE, LIQUID FILLED ORAL at 21:20

## 2021-04-08 RX ADMIN — LEVOTHYROXINE SODIUM SCH MCG: 100 TABLET ORAL at 06:31

## 2021-04-09 VITALS — DIASTOLIC BLOOD PRESSURE: 61 MMHG | SYSTOLIC BLOOD PRESSURE: 130 MMHG

## 2021-04-09 RX ADMIN — LEVOTHYROXINE SODIUM SCH MCG: 100 TABLET ORAL at 06:27

## 2021-04-09 RX ADMIN — DOCUSATE SODIUM SCH MG: 100 CAPSULE, LIQUID FILLED ORAL at 09:55

## 2021-04-09 RX ADMIN — ACETAMINOPHEN SCH MG: 500 TABLET ORAL at 09:55

## 2021-04-09 RX ADMIN — DOCUSATE SODIUM SCH MG: 100 CAPSULE, LIQUID FILLED ORAL at 20:39

## 2021-04-09 RX ADMIN — ACETAMINOPHEN SCH MG: 500 TABLET ORAL at 20:39

## 2021-04-10 VITALS — SYSTOLIC BLOOD PRESSURE: 135 MMHG | DIASTOLIC BLOOD PRESSURE: 87 MMHG

## 2021-04-10 RX ADMIN — DOCUSATE SODIUM SCH MG: 100 CAPSULE, LIQUID FILLED ORAL at 20:01

## 2021-04-10 RX ADMIN — ACETAMINOPHEN SCH MG: 500 TABLET ORAL at 20:01

## 2021-04-10 RX ADMIN — LEVOTHYROXINE SODIUM SCH MCG: 100 TABLET ORAL at 05:53

## 2021-04-10 RX ADMIN — DOCUSATE SODIUM SCH MG: 100 CAPSULE, LIQUID FILLED ORAL at 07:44

## 2021-04-10 RX ADMIN — ACETAMINOPHEN SCH MG: 500 TABLET ORAL at 07:44

## 2021-04-11 VITALS — SYSTOLIC BLOOD PRESSURE: 133 MMHG | DIASTOLIC BLOOD PRESSURE: 75 MMHG

## 2021-04-11 RX ADMIN — DOCUSATE SODIUM SCH MG: 100 CAPSULE, LIQUID FILLED ORAL at 07:42

## 2021-04-11 RX ADMIN — DOCUSATE SODIUM SCH MG: 100 CAPSULE, LIQUID FILLED ORAL at 21:06

## 2021-04-11 RX ADMIN — ACETAMINOPHEN SCH MG: 500 TABLET ORAL at 21:07

## 2021-04-11 RX ADMIN — LEVOTHYROXINE SODIUM SCH MCG: 100 TABLET ORAL at 05:46

## 2021-04-11 RX ADMIN — ACETAMINOPHEN SCH MG: 500 TABLET ORAL at 07:42

## 2021-04-12 VITALS — SYSTOLIC BLOOD PRESSURE: 135 MMHG | DIASTOLIC BLOOD PRESSURE: 74 MMHG

## 2021-04-12 RX ADMIN — ACETAMINOPHEN SCH MG: 500 TABLET ORAL at 21:01

## 2021-04-12 RX ADMIN — LEVOTHYROXINE SODIUM SCH MCG: 100 TABLET ORAL at 06:35

## 2021-04-12 RX ADMIN — DOCUSATE SODIUM SCH MG: 100 CAPSULE, LIQUID FILLED ORAL at 08:21

## 2021-04-12 RX ADMIN — DOCUSATE SODIUM SCH MG: 100 CAPSULE, LIQUID FILLED ORAL at 21:01

## 2021-04-12 RX ADMIN — ACETAMINOPHEN SCH MG: 500 TABLET ORAL at 08:20

## 2021-04-13 VITALS — DIASTOLIC BLOOD PRESSURE: 70 MMHG | SYSTOLIC BLOOD PRESSURE: 165 MMHG

## 2021-04-13 RX ADMIN — ACETAMINOPHEN SCH MG: 500 TABLET ORAL at 20:11

## 2021-04-13 RX ADMIN — DOCUSATE SODIUM SCH MG: 100 CAPSULE, LIQUID FILLED ORAL at 08:03

## 2021-04-13 RX ADMIN — ACETAMINOPHEN SCH MG: 500 TABLET ORAL at 08:03

## 2021-04-13 RX ADMIN — DOCUSATE SODIUM SCH MG: 100 CAPSULE, LIQUID FILLED ORAL at 20:11

## 2021-04-13 RX ADMIN — LEVOTHYROXINE SODIUM SCH MCG: 100 TABLET ORAL at 06:06

## 2021-04-14 VITALS — SYSTOLIC BLOOD PRESSURE: 160 MMHG | DIASTOLIC BLOOD PRESSURE: 75 MMHG

## 2021-04-14 RX ADMIN — ACETAMINOPHEN SCH MG: 500 TABLET ORAL at 09:55

## 2021-04-14 RX ADMIN — DOCUSATE SODIUM SCH MG: 100 CAPSULE, LIQUID FILLED ORAL at 09:55

## 2021-04-14 RX ADMIN — ACETAMINOPHEN SCH MG: 500 TABLET ORAL at 19:50

## 2021-04-14 RX ADMIN — LEVOTHYROXINE SODIUM SCH MCG: 100 TABLET ORAL at 06:26

## 2021-04-14 RX ADMIN — DOCUSATE SODIUM SCH MG: 100 CAPSULE, LIQUID FILLED ORAL at 19:51

## 2021-04-15 VITALS — DIASTOLIC BLOOD PRESSURE: 55 MMHG | SYSTOLIC BLOOD PRESSURE: 115 MMHG

## 2021-04-15 RX ADMIN — DOCUSATE SODIUM SCH MG: 100 CAPSULE, LIQUID FILLED ORAL at 09:03

## 2021-04-15 RX ADMIN — ACETAMINOPHEN SCH MG: 500 TABLET ORAL at 09:03

## 2021-04-15 RX ADMIN — LEVOTHYROXINE SODIUM SCH MCG: 100 TABLET ORAL at 05:43

## 2021-04-15 RX ADMIN — DOCUSATE SODIUM SCH MG: 100 CAPSULE, LIQUID FILLED ORAL at 21:21

## 2021-04-15 RX ADMIN — ACETAMINOPHEN SCH MG: 500 TABLET ORAL at 21:21

## 2021-04-16 VITALS — SYSTOLIC BLOOD PRESSURE: 149 MMHG | DIASTOLIC BLOOD PRESSURE: 71 MMHG

## 2021-04-16 RX ADMIN — LEVOTHYROXINE SODIUM SCH MCG: 100 TABLET ORAL at 05:17

## 2021-04-16 RX ADMIN — ACETAMINOPHEN SCH MG: 500 TABLET ORAL at 21:00

## 2021-04-16 RX ADMIN — DOCUSATE SODIUM SCH MG: 100 CAPSULE, LIQUID FILLED ORAL at 11:46

## 2021-04-16 RX ADMIN — DOCUSATE SODIUM SCH MG: 100 CAPSULE, LIQUID FILLED ORAL at 21:00

## 2021-04-16 RX ADMIN — ACETAMINOPHEN SCH MG: 500 TABLET ORAL at 11:47

## 2021-04-17 VITALS — DIASTOLIC BLOOD PRESSURE: 73 MMHG | SYSTOLIC BLOOD PRESSURE: 141 MMHG

## 2021-04-17 RX ADMIN — LEVOTHYROXINE SODIUM SCH MCG: 100 TABLET ORAL at 06:48

## 2021-04-17 RX ADMIN — DOCUSATE SODIUM SCH MG: 100 CAPSULE, LIQUID FILLED ORAL at 20:27

## 2021-04-17 RX ADMIN — ACETAMINOPHEN SCH MG: 500 TABLET ORAL at 10:01

## 2021-04-17 RX ADMIN — DOCUSATE SODIUM SCH MG: 100 CAPSULE, LIQUID FILLED ORAL at 10:00

## 2021-04-17 RX ADMIN — ACETAMINOPHEN SCH MG: 500 TABLET ORAL at 20:27

## 2021-04-17 RX ADMIN — QUETIAPINE FUMARATE SCH MG: 25 TABLET ORAL at 20:27

## 2021-04-18 VITALS — SYSTOLIC BLOOD PRESSURE: 155 MMHG | DIASTOLIC BLOOD PRESSURE: 69 MMHG

## 2021-04-18 RX ADMIN — ACETAMINOPHEN SCH MG: 500 TABLET ORAL at 09:46

## 2021-04-18 RX ADMIN — DOCUSATE SODIUM SCH MG: 100 CAPSULE, LIQUID FILLED ORAL at 20:11

## 2021-04-18 RX ADMIN — DOCUSATE SODIUM SCH MG: 100 CAPSULE, LIQUID FILLED ORAL at 09:46

## 2021-04-18 RX ADMIN — QUETIAPINE FUMARATE SCH MG: 25 TABLET ORAL at 20:11

## 2021-04-18 RX ADMIN — QUETIAPINE FUMARATE SCH MG: 25 TABLET ORAL at 09:46

## 2021-04-18 RX ADMIN — LEVOTHYROXINE SODIUM SCH MCG: 100 TABLET ORAL at 06:45

## 2021-04-18 RX ADMIN — ACETAMINOPHEN SCH MG: 500 TABLET ORAL at 20:11

## 2021-04-19 VITALS — SYSTOLIC BLOOD PRESSURE: 149 MMHG | DIASTOLIC BLOOD PRESSURE: 72 MMHG

## 2021-04-19 RX ADMIN — DOCUSATE SODIUM SCH MG: 100 CAPSULE, LIQUID FILLED ORAL at 09:41

## 2021-04-19 RX ADMIN — ACETAMINOPHEN SCH MG: 500 TABLET ORAL at 20:34

## 2021-04-19 RX ADMIN — QUETIAPINE FUMARATE SCH MG: 25 TABLET ORAL at 09:41

## 2021-04-19 RX ADMIN — LEVOTHYROXINE SODIUM SCH MCG: 100 TABLET ORAL at 06:26

## 2021-04-19 RX ADMIN — QUETIAPINE FUMARATE SCH MG: 25 TABLET ORAL at 20:34

## 2021-04-19 RX ADMIN — DOCUSATE SODIUM SCH MG: 100 CAPSULE, LIQUID FILLED ORAL at 20:34

## 2021-04-19 RX ADMIN — ACETAMINOPHEN SCH MG: 500 TABLET ORAL at 09:41

## 2021-04-20 VITALS — DIASTOLIC BLOOD PRESSURE: 65 MMHG | SYSTOLIC BLOOD PRESSURE: 144 MMHG

## 2021-04-20 RX ADMIN — QUETIAPINE FUMARATE SCH MG: 25 TABLET ORAL at 08:02

## 2021-04-20 RX ADMIN — DOCUSATE SODIUM SCH MG: 100 CAPSULE, LIQUID FILLED ORAL at 20:34

## 2021-04-20 RX ADMIN — QUETIAPINE FUMARATE SCH MG: 25 TABLET ORAL at 20:34

## 2021-04-20 RX ADMIN — ACETAMINOPHEN SCH MG: 500 TABLET ORAL at 20:34

## 2021-04-20 RX ADMIN — DOCUSATE SODIUM SCH MG: 100 CAPSULE, LIQUID FILLED ORAL at 08:02

## 2021-04-20 RX ADMIN — LEVOTHYROXINE SODIUM SCH MCG: 100 TABLET ORAL at 05:46

## 2021-04-20 RX ADMIN — ACETAMINOPHEN SCH MG: 500 TABLET ORAL at 08:02

## 2021-04-20 NOTE — IPNPDOC
Text Note


Date of Service


The patient was seen on 4/20/21.





NOTE


Subjective:


Patient is an 85-year-old male with a PMHx of Hypothyroidism, HTN who was 

admitted for altered mental status with behavioral abnormality 

(aggression/violence) and diagnosed with dementia and now awaiting long term 

placement. 





Patient was seen and examined at the bedside. Patient was seen sitting up in 

chair watching television, was not in any acute distress.





Objective:


Vitals (See below)


General: Lying in bed, no acute distress, comfortable, Awake / alert 


HEENT: NC, AT


CVS: +S1S2


Lungs: Fair air entry b/l, -w/r/r


Abdomen: Soft, ND, NT


Extremities: - Edema, - Calf tenderness





Assessment and plan:


Dementia - likely 2/2 to Lewy Body dementia/Parkinson's dementia? and/vs. likely

vascular dementia


- Patient is sitting up in chair watching television


- Has experience intermittent episodes of agitation and frustration


- Hemodynamically stable / Afebrile 


- MRI negative for acute pathology 


- No signs of infection 


- c/w Haldol / Seroquel 


- PFS looking into long-term placement





CKD3


- Cr stable





Anemia, macrocytic


- Vit B12, folate wnl


- No evidence of bleeding





Hypothyroidism


- c/w adjusted dose of levothyroxine





HTN


- BP well controlled 





Heel pain pain during walking


- c/w Tylenol PRN 





DVT prophylaxis


- c/w TEDs/[employer]entials 





Disposition:


- Pending placement





VS,Fishbone, I+O


VS, Fishbone, I+O





Vital Signs








  Date Time  Temp Pulse Resp B/P (MAP) Pulse Ox O2 Delivery O2 Flow Rate FiO2


 


4/20/21 06:01 97.1 65 16 144/65 (91) 100 Room Air  














I&O- Last 24 Hours up to 6 AM 


 


 4/20/21





 06:00


 


Intake Total 1610 ml


 


Balance 1610 ml

















RICKY GOSS MD                Apr 20, 2021 13:41

## 2021-04-21 VITALS — SYSTOLIC BLOOD PRESSURE: 158 MMHG | DIASTOLIC BLOOD PRESSURE: 71 MMHG

## 2021-04-21 RX ADMIN — DOCUSATE SODIUM SCH MG: 100 CAPSULE, LIQUID FILLED ORAL at 09:20

## 2021-04-21 RX ADMIN — ACETAMINOPHEN PRN MG: 325 TABLET ORAL at 04:28

## 2021-04-21 RX ADMIN — DOCUSATE SODIUM SCH MG: 100 CAPSULE, LIQUID FILLED ORAL at 20:18

## 2021-04-21 RX ADMIN — ACETAMINOPHEN SCH MG: 500 TABLET ORAL at 20:17

## 2021-04-21 RX ADMIN — QUETIAPINE FUMARATE SCH MG: 25 TABLET ORAL at 20:17

## 2021-04-21 RX ADMIN — QUETIAPINE FUMARATE SCH MG: 25 TABLET ORAL at 09:21

## 2021-04-21 RX ADMIN — LEVOTHYROXINE SODIUM SCH MCG: 100 TABLET ORAL at 05:16

## 2021-04-21 RX ADMIN — ACETAMINOPHEN SCH MG: 500 TABLET ORAL at 09:20

## 2021-04-21 RX ADMIN — SENNOSIDES PRN TAB: 8.6 TABLET, FILM COATED ORAL at 04:27

## 2021-04-22 VITALS — SYSTOLIC BLOOD PRESSURE: 148 MMHG | DIASTOLIC BLOOD PRESSURE: 70 MMHG

## 2021-04-22 LAB
BASOPHILS # BLD AUTO: 0 10^3/UL (ref 0–0.2)
BASOPHILS NFR BLD AUTO: 0.5 % (ref 0–1)
BUN SERPL-MCNC: 29 MG/DL (ref 7–18)
CALCIUM SERPL-MCNC: 9.3 MG/DL (ref 8.8–10.2)
CHLORIDE SERPL-SCNC: 108 MEQ/L (ref 98–107)
CO2 SERPL-SCNC: 28 MEQ/L (ref 21–32)
CREAT SERPL-MCNC: 1.34 MG/DL (ref 0.7–1.3)
EOSINOPHIL # BLD AUTO: 0.1 10^3/UL (ref 0–0.5)
EOSINOPHIL NFR BLD AUTO: 2.8 % (ref 0–3)
GFR SERPL CREATININE-BSD FRML MDRD: 54.2 ML/MIN/{1.73_M2} (ref 35–?)
GLUCOSE SERPL-MCNC: 102 MG/DL (ref 70–100)
HCT VFR BLD AUTO: 36.1 % (ref 42–52)
HGB BLD-MCNC: 11.8 G/DL (ref 13.5–17.5)
LYMPHOCYTES # BLD AUTO: 1.4 10^3/UL (ref 1.5–5)
LYMPHOCYTES NFR BLD AUTO: 35 % (ref 24–44)
MAGNESIUM SERPL-MCNC: 2.2 MG/DL (ref 1.8–2.4)
MCH RBC QN AUTO: 32.1 PG (ref 27–33)
MCHC RBC AUTO-ENTMCNC: 32.7 G/DL (ref 32–36.5)
MCV RBC AUTO: 98.1 FL (ref 80–96)
MONOCYTES # BLD AUTO: 0.4 10^3/UL (ref 0–0.8)
MONOCYTES NFR BLD AUTO: 10.5 % (ref 2–8)
NEUTROPHILS # BLD AUTO: 2 10^3/UL (ref 1.5–8.5)
NEUTROPHILS NFR BLD AUTO: 50.9 % (ref 36–66)
PLATELET # BLD AUTO: 203 10^3/UL (ref 150–450)
POTASSIUM SERPL-SCNC: 4.9 MEQ/L (ref 3.5–5.1)
RBC # BLD AUTO: 3.68 10^6/UL (ref 4.3–6.1)
SODIUM SERPL-SCNC: 140 MEQ/L (ref 136–145)
WBC # BLD AUTO: 4 10^3/UL (ref 4–10)

## 2021-04-22 RX ADMIN — LEVOTHYROXINE SODIUM SCH MCG: 100 TABLET ORAL at 06:40

## 2021-04-22 RX ADMIN — DOCUSATE SODIUM SCH MG: 100 CAPSULE, LIQUID FILLED ORAL at 09:14

## 2021-04-22 RX ADMIN — QUETIAPINE FUMARATE SCH MG: 25 TABLET ORAL at 21:21

## 2021-04-22 RX ADMIN — QUETIAPINE FUMARATE SCH MG: 25 TABLET ORAL at 09:15

## 2021-04-22 RX ADMIN — ACETAMINOPHEN SCH MG: 500 TABLET ORAL at 21:21

## 2021-04-22 RX ADMIN — ACETAMINOPHEN SCH MG: 500 TABLET ORAL at 09:14

## 2021-04-22 RX ADMIN — DOCUSATE SODIUM SCH MG: 100 CAPSULE, LIQUID FILLED ORAL at 21:21

## 2021-04-23 VITALS — DIASTOLIC BLOOD PRESSURE: 67 MMHG | SYSTOLIC BLOOD PRESSURE: 121 MMHG

## 2021-04-23 RX ADMIN — DOCUSATE SODIUM SCH MG: 100 CAPSULE, LIQUID FILLED ORAL at 08:52

## 2021-04-23 RX ADMIN — DOCUSATE SODIUM SCH MG: 100 CAPSULE, LIQUID FILLED ORAL at 21:06

## 2021-04-23 RX ADMIN — QUETIAPINE FUMARATE SCH MG: 25 TABLET ORAL at 08:52

## 2021-04-23 RX ADMIN — ACETAMINOPHEN SCH MG: 500 TABLET ORAL at 21:07

## 2021-04-23 RX ADMIN — ACETAMINOPHEN SCH MG: 500 TABLET ORAL at 08:52

## 2021-04-23 RX ADMIN — QUETIAPINE FUMARATE SCH MG: 25 TABLET ORAL at 21:06

## 2021-04-23 RX ADMIN — LEVOTHYROXINE SODIUM SCH MCG: 100 TABLET ORAL at 06:18

## 2021-04-24 VITALS — DIASTOLIC BLOOD PRESSURE: 62 MMHG | SYSTOLIC BLOOD PRESSURE: 147 MMHG

## 2021-04-24 RX ADMIN — ACETAMINOPHEN SCH MG: 500 TABLET ORAL at 08:18

## 2021-04-24 RX ADMIN — QUETIAPINE FUMARATE SCH MG: 25 TABLET ORAL at 20:29

## 2021-04-24 RX ADMIN — ACETAMINOPHEN SCH MG: 500 TABLET ORAL at 20:30

## 2021-04-24 RX ADMIN — DOCUSATE SODIUM SCH MG: 100 CAPSULE, LIQUID FILLED ORAL at 20:29

## 2021-04-24 RX ADMIN — LEVOTHYROXINE SODIUM SCH MCG: 100 TABLET ORAL at 05:28

## 2021-04-24 RX ADMIN — QUETIAPINE FUMARATE SCH MG: 25 TABLET ORAL at 08:19

## 2021-04-24 RX ADMIN — DOCUSATE SODIUM SCH MG: 100 CAPSULE, LIQUID FILLED ORAL at 08:19

## 2021-04-25 VITALS — SYSTOLIC BLOOD PRESSURE: 130 MMHG | DIASTOLIC BLOOD PRESSURE: 62 MMHG

## 2021-04-25 RX ADMIN — LEVOTHYROXINE SODIUM SCH MCG: 100 TABLET ORAL at 05:43

## 2021-04-25 RX ADMIN — ACETAMINOPHEN SCH MG: 500 TABLET ORAL at 21:16

## 2021-04-25 RX ADMIN — DOCUSATE SODIUM SCH MG: 100 CAPSULE, LIQUID FILLED ORAL at 09:00

## 2021-04-25 RX ADMIN — QUETIAPINE FUMARATE SCH MG: 25 TABLET ORAL at 09:00

## 2021-04-25 RX ADMIN — ACETAMINOPHEN SCH MG: 500 TABLET ORAL at 09:00

## 2021-04-25 RX ADMIN — DOCUSATE SODIUM SCH MG: 100 CAPSULE, LIQUID FILLED ORAL at 21:16

## 2021-04-25 RX ADMIN — QUETIAPINE FUMARATE SCH MG: 25 TABLET ORAL at 21:16

## 2021-04-26 VITALS — SYSTOLIC BLOOD PRESSURE: 110 MMHG | DIASTOLIC BLOOD PRESSURE: 45 MMHG

## 2021-04-26 RX ADMIN — ACETAMINOPHEN SCH MG: 500 TABLET ORAL at 08:15

## 2021-04-26 RX ADMIN — QUETIAPINE FUMARATE SCH MG: 25 TABLET ORAL at 08:16

## 2021-04-26 RX ADMIN — DOCUSATE SODIUM SCH MG: 100 CAPSULE, LIQUID FILLED ORAL at 20:36

## 2021-04-26 RX ADMIN — QUETIAPINE FUMARATE SCH MG: 25 TABLET ORAL at 20:35

## 2021-04-26 RX ADMIN — LEVOTHYROXINE SODIUM SCH MCG: 100 TABLET ORAL at 05:17

## 2021-04-26 RX ADMIN — ACETAMINOPHEN SCH MG: 500 TABLET ORAL at 20:35

## 2021-04-26 RX ADMIN — DOCUSATE SODIUM SCH MG: 100 CAPSULE, LIQUID FILLED ORAL at 08:16

## 2021-04-27 VITALS — SYSTOLIC BLOOD PRESSURE: 127 MMHG | DIASTOLIC BLOOD PRESSURE: 70 MMHG

## 2021-04-27 RX ADMIN — ACETAMINOPHEN SCH MG: 500 TABLET ORAL at 08:57

## 2021-04-27 RX ADMIN — DOCUSATE SODIUM SCH MG: 100 CAPSULE, LIQUID FILLED ORAL at 20:06

## 2021-04-27 RX ADMIN — QUETIAPINE FUMARATE SCH MG: 25 TABLET ORAL at 08:57

## 2021-04-27 RX ADMIN — LEVOTHYROXINE SODIUM SCH MCG: 100 TABLET ORAL at 05:50

## 2021-04-27 RX ADMIN — ACETAMINOPHEN SCH MG: 500 TABLET ORAL at 20:06

## 2021-04-27 RX ADMIN — QUETIAPINE FUMARATE SCH MG: 25 TABLET ORAL at 20:06

## 2021-04-27 RX ADMIN — DOCUSATE SODIUM SCH MG: 100 CAPSULE, LIQUID FILLED ORAL at 08:56

## 2021-04-28 VITALS — SYSTOLIC BLOOD PRESSURE: 117 MMHG | DIASTOLIC BLOOD PRESSURE: 48 MMHG

## 2021-04-28 RX ADMIN — LEVOTHYROXINE SODIUM SCH MCG: 100 TABLET ORAL at 06:53

## 2021-04-28 RX ADMIN — QUETIAPINE FUMARATE SCH MG: 25 TABLET ORAL at 07:55

## 2021-04-28 RX ADMIN — DOCUSATE SODIUM SCH MG: 100 CAPSULE, LIQUID FILLED ORAL at 21:02

## 2021-04-28 RX ADMIN — QUETIAPINE FUMARATE SCH MG: 25 TABLET ORAL at 21:02

## 2021-04-28 RX ADMIN — ACETAMINOPHEN SCH MG: 500 TABLET ORAL at 21:03

## 2021-04-28 RX ADMIN — DOCUSATE SODIUM SCH MG: 100 CAPSULE, LIQUID FILLED ORAL at 07:55

## 2021-04-28 RX ADMIN — ACETAMINOPHEN SCH MG: 500 TABLET ORAL at 07:55

## 2021-04-29 VITALS — SYSTOLIC BLOOD PRESSURE: 121 MMHG | DIASTOLIC BLOOD PRESSURE: 59 MMHG

## 2021-04-29 RX ADMIN — ACETAMINOPHEN PRN MG: 325 TABLET ORAL at 02:38

## 2021-04-29 RX ADMIN — QUETIAPINE FUMARATE SCH MG: 25 TABLET ORAL at 20:15

## 2021-04-29 RX ADMIN — LEVOTHYROXINE SODIUM SCH MCG: 100 TABLET ORAL at 06:05

## 2021-04-29 RX ADMIN — QUETIAPINE FUMARATE SCH MG: 25 TABLET ORAL at 09:07

## 2021-04-29 RX ADMIN — DOCUSATE SODIUM SCH MG: 100 CAPSULE, LIQUID FILLED ORAL at 09:05

## 2021-04-29 RX ADMIN — ACETAMINOPHEN SCH MG: 500 TABLET ORAL at 20:15

## 2021-04-29 RX ADMIN — ACETAMINOPHEN SCH MG: 500 TABLET ORAL at 09:05

## 2021-04-29 RX ADMIN — DOCUSATE SODIUM SCH MG: 100 CAPSULE, LIQUID FILLED ORAL at 20:15

## 2021-04-30 VITALS — SYSTOLIC BLOOD PRESSURE: 141 MMHG | DIASTOLIC BLOOD PRESSURE: 59 MMHG

## 2021-04-30 RX ADMIN — ACETAMINOPHEN SCH MG: 500 TABLET ORAL at 09:14

## 2021-04-30 RX ADMIN — QUETIAPINE FUMARATE SCH MG: 25 TABLET ORAL at 20:58

## 2021-04-30 RX ADMIN — DOCUSATE SODIUM SCH MG: 100 CAPSULE, LIQUID FILLED ORAL at 20:58

## 2021-04-30 RX ADMIN — DOCUSATE SODIUM SCH MG: 100 CAPSULE, LIQUID FILLED ORAL at 09:14

## 2021-04-30 RX ADMIN — LEVOTHYROXINE SODIUM SCH MCG: 100 TABLET ORAL at 05:51

## 2021-04-30 RX ADMIN — QUETIAPINE FUMARATE SCH MG: 25 TABLET ORAL at 09:14

## 2021-04-30 RX ADMIN — ACETAMINOPHEN SCH MG: 500 TABLET ORAL at 20:58

## 2021-05-01 VITALS — SYSTOLIC BLOOD PRESSURE: 138 MMHG | DIASTOLIC BLOOD PRESSURE: 60 MMHG

## 2021-05-01 RX ADMIN — LEVOTHYROXINE SODIUM SCH MCG: 100 TABLET ORAL at 05:28

## 2021-05-01 RX ADMIN — ACETAMINOPHEN SCH MG: 500 TABLET ORAL at 08:12

## 2021-05-01 RX ADMIN — DOCUSATE SODIUM SCH MG: 100 CAPSULE, LIQUID FILLED ORAL at 08:12

## 2021-05-01 RX ADMIN — QUETIAPINE FUMARATE SCH MG: 25 TABLET ORAL at 08:12

## 2021-05-01 RX ADMIN — ACETAMINOPHEN SCH MG: 500 TABLET ORAL at 20:29

## 2021-05-01 RX ADMIN — QUETIAPINE FUMARATE SCH MG: 25 TABLET ORAL at 20:30

## 2021-05-01 RX ADMIN — DOCUSATE SODIUM SCH MG: 100 CAPSULE, LIQUID FILLED ORAL at 20:29

## 2021-05-02 VITALS — DIASTOLIC BLOOD PRESSURE: 61 MMHG | SYSTOLIC BLOOD PRESSURE: 120 MMHG

## 2021-05-02 LAB
ALBUMIN SERPL BCG-MCNC: 3.6 GM/DL (ref 3.2–5.2)
ALT SERPL W P-5'-P-CCNC: 24 U/L (ref 12–78)
BILIRUB SERPL-MCNC: 0.3 MG/DL (ref 0.2–1)
BUN SERPL-MCNC: 30 MG/DL (ref 7–18)
CALCIUM SERPL-MCNC: 9.1 MG/DL (ref 8.8–10.2)
CHLORIDE SERPL-SCNC: 111 MEQ/L (ref 98–107)
CO2 SERPL-SCNC: 24 MEQ/L (ref 21–32)
CREAT SERPL-MCNC: 1.34 MG/DL (ref 0.7–1.3)
GFR SERPL CREATININE-BSD FRML MDRD: 54.1 ML/MIN/{1.73_M2} (ref 35–?)
GLUCOSE SERPL-MCNC: 113 MG/DL (ref 70–100)
HCT VFR BLD AUTO: 35.6 % (ref 42–52)
HGB BLD-MCNC: 11.6 G/DL (ref 13.5–17.5)
MCH RBC QN AUTO: 31.8 PG (ref 27–33)
MCHC RBC AUTO-ENTMCNC: 32.6 G/DL (ref 32–36.5)
MCV RBC AUTO: 97.5 FL (ref 80–96)
PLATELET # BLD AUTO: 227 10^3/UL (ref 150–450)
POTASSIUM SERPL-SCNC: 4.8 MEQ/L (ref 3.5–5.1)
PROT SERPL-MCNC: 7.3 GM/DL (ref 6.4–8.2)
RBC # BLD AUTO: 3.65 10^6/UL (ref 4.3–6.1)
SODIUM SERPL-SCNC: 141 MEQ/L (ref 136–145)
WBC # BLD AUTO: 4.8 10^3/UL (ref 4–10)

## 2021-05-02 RX ADMIN — QUETIAPINE FUMARATE SCH MG: 25 TABLET ORAL at 19:54

## 2021-05-02 RX ADMIN — ACETAMINOPHEN SCH MG: 500 TABLET ORAL at 19:54

## 2021-05-02 RX ADMIN — ACETAMINOPHEN SCH MG: 500 TABLET ORAL at 08:29

## 2021-05-02 RX ADMIN — QUETIAPINE FUMARATE SCH MG: 25 TABLET ORAL at 08:29

## 2021-05-02 RX ADMIN — DOCUSATE SODIUM SCH MG: 100 CAPSULE, LIQUID FILLED ORAL at 08:29

## 2021-05-02 RX ADMIN — LEVOTHYROXINE SODIUM SCH MCG: 100 TABLET ORAL at 05:54

## 2021-05-02 RX ADMIN — DOCUSATE SODIUM SCH MG: 100 CAPSULE, LIQUID FILLED ORAL at 19:54

## 2021-05-02 NOTE — IPNPDOC
Text Note


Date of Service


The patient was seen on 5/2/21.





NOTE


Subjective:


Patient is an 85-year-old male with a PMHx of Hypothyroidism, HTN who was 

admitted for altered mental status with behavioral abnormality 

(aggression/violence) and diagnosed with dementia and now awaiting long term 

placement. Patient voices no new medical complaints. No acute events reported.





Objective:


Vitals (See below)


General: Lying in bed, no acute distress, comfortable, Awake / alert 


HEENT: NC, AT


CVS: +S1S2


Lungs: Fair air entry b/l, -w/r/r


Abdomen: Soft, ND, NT


Extremities: - Edema, - Calf tenderness





A/P:


Patient is an 85-year-old male with a PMHx of Hypothyroidism, HTN who was 

admitted for altered mental status with behavioral abnormality 

(aggression/violence) and diagnosed with dementia and now awaiting long term 

placement. 





#Dementia - likely 2/2 to Lewy Body dementia/Parkinson's dementia? and/vs. 

likely vascular dementia


- Has experience intermittent episodes of agitation and frustration


- MRI negative for acute pathology 


- No signs of infection 


- c/w Haldol / Seroquel 


- PFS looking into long-term placement





#CKD3


- stable





#Anemia, macrocytic


- Vit B12, folate wnl





#Hypothyroidism


- c/w adjusted dose of levothyroxine





#HTN


- BP well controlled 





#Heel pain pain during walking


- c/w Tylenol PRN 





#DVT prophylaxis


- mechanical





Disposition:


- Pending placement





VS,Fishbone, I+O


VS, Fishbone, I+O





Vital Signs








  Date Time  Temp Pulse Resp B/P (MAP) Pulse Ox O2 Delivery O2 Flow Rate FiO2


 


5/2/21 06:00 97.3 54 18 120/61 (80) 97 Room Air  














I&O- Last 24 Hours up to 6 AM 


 


 5/2/21





 06:00


 


Intake Total 1200 ml


 


Output Total 0 ml


 


Balance 1200 ml

















RAMON WISE MD               May 2, 2021 07:22

## 2021-05-03 VITALS — SYSTOLIC BLOOD PRESSURE: 138 MMHG | DIASTOLIC BLOOD PRESSURE: 57 MMHG

## 2021-05-03 RX ADMIN — LEVOTHYROXINE SODIUM SCH MCG: 100 TABLET ORAL at 05:54

## 2021-05-03 RX ADMIN — ACETAMINOPHEN SCH MG: 500 TABLET ORAL at 21:19

## 2021-05-03 RX ADMIN — QUETIAPINE FUMARATE SCH MG: 25 TABLET ORAL at 08:07

## 2021-05-03 RX ADMIN — QUETIAPINE FUMARATE SCH MG: 25 TABLET ORAL at 21:19

## 2021-05-03 RX ADMIN — DOCUSATE SODIUM SCH MG: 100 CAPSULE, LIQUID FILLED ORAL at 08:08

## 2021-05-03 RX ADMIN — ACETAMINOPHEN SCH MG: 500 TABLET ORAL at 08:08

## 2021-05-03 RX ADMIN — DOCUSATE SODIUM SCH MG: 100 CAPSULE, LIQUID FILLED ORAL at 21:18

## 2021-05-04 VITALS — DIASTOLIC BLOOD PRESSURE: 60 MMHG | SYSTOLIC BLOOD PRESSURE: 134 MMHG

## 2021-05-04 RX ADMIN — QUETIAPINE FUMARATE SCH MG: 25 TABLET ORAL at 09:32

## 2021-05-04 RX ADMIN — DOCUSATE SODIUM SCH MG: 100 CAPSULE, LIQUID FILLED ORAL at 20:45

## 2021-05-04 RX ADMIN — QUETIAPINE FUMARATE SCH MG: 25 TABLET ORAL at 20:45

## 2021-05-04 RX ADMIN — DOCUSATE SODIUM SCH MG: 100 CAPSULE, LIQUID FILLED ORAL at 09:32

## 2021-05-04 RX ADMIN — ACETAMINOPHEN SCH MG: 500 TABLET ORAL at 20:45

## 2021-05-04 RX ADMIN — LEVOTHYROXINE SODIUM SCH MCG: 100 TABLET ORAL at 05:12

## 2021-05-04 RX ADMIN — ACETAMINOPHEN SCH MG: 500 TABLET ORAL at 09:33

## 2021-05-05 VITALS — DIASTOLIC BLOOD PRESSURE: 64 MMHG | SYSTOLIC BLOOD PRESSURE: 133 MMHG

## 2021-05-05 RX ADMIN — LEVOTHYROXINE SODIUM SCH MCG: 100 TABLET ORAL at 06:30
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
